# Patient Record
Sex: FEMALE | Race: WHITE | NOT HISPANIC OR LATINO | Employment: OTHER | URBAN - METROPOLITAN AREA
[De-identification: names, ages, dates, MRNs, and addresses within clinical notes are randomized per-mention and may not be internally consistent; named-entity substitution may affect disease eponyms.]

---

## 2017-05-15 ENCOUNTER — HOSPITAL ENCOUNTER (OUTPATIENT)
Dept: MAMMOGRAPHY | Facility: HOSPITAL | Age: 77
Discharge: HOME | End: 2017-05-15

## 2017-08-24 ENCOUNTER — OFFICE (AMBULATORY)
Dept: URBAN - METROPOLITAN AREA CLINIC 64 | Facility: CLINIC | Age: 77
End: 2017-08-24
Payer: COMMERCIAL

## 2017-08-24 VITALS
HEART RATE: 60 BPM | WEIGHT: 168 LBS | SYSTOLIC BLOOD PRESSURE: 128 MMHG | DIASTOLIC BLOOD PRESSURE: 71 MMHG | HEIGHT: 65 IN

## 2017-08-24 DIAGNOSIS — K59.00 CONSTIPATION, UNSPECIFIED: ICD-10-CM

## 2017-08-24 PROCEDURE — 99212 OFFICE O/P EST SF 10 MIN: CPT | Performed by: NURSE PRACTITIONER

## 2017-08-24 RX ORDER — MULTIVIT-MIN/IRON/FOLIC ACID/K 18-600-40
400 CAPSULE ORAL
Qty: 30 | Refills: 5 | Status: ACTIVE
Start: 2017-08-24

## 2019-06-05 ENCOUNTER — HOSPITAL ENCOUNTER (OUTPATIENT)
Dept: MAMMOGRAPHY | Facility: HOSPITAL | Age: 79
Discharge: HOME | End: 2019-06-05
Attending: OBSTETRICS & GYNECOLOGY | Admitting: OBSTETRICS & GYNECOLOGY

## 2019-07-17 ENCOUNTER — LAB REQUISITION (OUTPATIENT)
Dept: LAB | Facility: HOSPITAL | Age: 79
End: 2019-07-17

## 2019-07-17 ENCOUNTER — OFFICE (AMBULATORY)
Dept: URBAN - METROPOLITAN AREA PATHOLOGY 4 | Facility: PATHOLOGY | Age: 79
End: 2019-07-17
Payer: COMMERCIAL

## 2019-07-17 ENCOUNTER — ON CAMPUS - OUTPATIENT (AMBULATORY)
Dept: URBAN - METROPOLITAN AREA HOSPITAL 2 | Facility: HOSPITAL | Age: 79
End: 2019-07-17
Payer: COMMERCIAL

## 2019-07-17 VITALS
SYSTOLIC BLOOD PRESSURE: 108 MMHG | SYSTOLIC BLOOD PRESSURE: 106 MMHG | HEART RATE: 66 BPM | HEIGHT: 65 IN | TEMPERATURE: 96.6 F | SYSTOLIC BLOOD PRESSURE: 100 MMHG | SYSTOLIC BLOOD PRESSURE: 129 MMHG | HEART RATE: 54 BPM | WEIGHT: 150.6 LBS | HEART RATE: 58 BPM | OXYGEN SATURATION: 100 % | HEART RATE: 56 BPM | SYSTOLIC BLOOD PRESSURE: 97 MMHG | OXYGEN SATURATION: 99 % | DIASTOLIC BLOOD PRESSURE: 75 MMHG | OXYGEN SATURATION: 98 % | DIASTOLIC BLOOD PRESSURE: 53 MMHG | SYSTOLIC BLOOD PRESSURE: 104 MMHG | HEART RATE: 57 BPM | SYSTOLIC BLOOD PRESSURE: 95 MMHG | HEART RATE: 67 BPM | HEART RATE: 68 BPM | RESPIRATION RATE: 14 BRPM | DIASTOLIC BLOOD PRESSURE: 65 MMHG | DIASTOLIC BLOOD PRESSURE: 43 MMHG | RESPIRATION RATE: 20 BRPM | SYSTOLIC BLOOD PRESSURE: 101 MMHG | SYSTOLIC BLOOD PRESSURE: 125 MMHG | DIASTOLIC BLOOD PRESSURE: 64 MMHG | RESPIRATION RATE: 16 BRPM | OXYGEN SATURATION: 97 % | DIASTOLIC BLOOD PRESSURE: 46 MMHG | DIASTOLIC BLOOD PRESSURE: 55 MMHG | HEART RATE: 60 BPM | RESPIRATION RATE: 18 BRPM | SYSTOLIC BLOOD PRESSURE: 92 MMHG | DIASTOLIC BLOOD PRESSURE: 42 MMHG

## 2019-07-17 DIAGNOSIS — D12.2 BENIGN NEOPLASM OF ASCENDING COLON: ICD-10-CM

## 2019-07-17 DIAGNOSIS — K64.8 OTHER HEMORRHOIDS: ICD-10-CM

## 2019-07-17 DIAGNOSIS — Z86.010 PERSONAL HISTORY OF COLONIC POLYPS: ICD-10-CM

## 2019-07-17 DIAGNOSIS — K57.30 DIVERTICULOSIS OF LARGE INTESTINE WITHOUT PERFORATION OR ABS: ICD-10-CM

## 2019-07-17 DIAGNOSIS — Z86.010 HISTORY OF COLONIC POLYPS: ICD-10-CM

## 2019-07-17 DIAGNOSIS — K57.30 DIVERTICULOSIS OF LARGE INTESTINE WITHOUT PERFORATION OR ABSCESS WITHOUT BLEEDING: ICD-10-CM

## 2019-07-17 LAB
GI HISTOLOGY: A. UNSPECIFIED: (no result)
GI HISTOLOGY: PDF REPORT: (no result)

## 2019-07-17 PROCEDURE — 88305 TISSUE EXAM BY PATHOLOGIST: CPT | Mod: 26 | Performed by: INTERNAL MEDICINE

## 2019-07-17 PROCEDURE — 45380 COLONOSCOPY AND BIOPSY: CPT | Mod: PT | Performed by: INTERNAL MEDICINE

## 2019-07-17 PROCEDURE — 88305 TISSUE EXAM BY PATHOLOGIST: CPT | Performed by: INTERNAL MEDICINE

## 2019-07-17 RX ORDER — LUBIPROSTONE 24 UG/1
CAPSULE, GELATIN COATED ORAL
Qty: 90 | Refills: 3 | Status: ACTIVE
Start: 2019-07-17

## 2019-07-18 LAB
LAB AP CASE REPORT: NORMAL
PATH REPORT.FINAL DX SPEC: NORMAL
PATH REPORT.GROSS SPEC: NORMAL

## 2019-09-18 ENCOUNTER — OFFICE VISIT (OUTPATIENT)
Dept: NEUROLOGY | Facility: CLINIC | Age: 79
End: 2019-09-18

## 2019-09-18 VITALS
SYSTOLIC BLOOD PRESSURE: 144 MMHG | DIASTOLIC BLOOD PRESSURE: 65 MMHG | WEIGHT: 153.4 LBS | BODY MASS INDEX: 26.19 KG/M2 | HEIGHT: 64 IN | HEART RATE: 71 BPM

## 2019-09-18 DIAGNOSIS — G25.81 RESTLESS LEGS SYNDROME: Primary | ICD-10-CM

## 2019-09-18 DIAGNOSIS — G60.9 IDIOPATHIC PERIPHERAL NEUROPATHY: ICD-10-CM

## 2019-09-18 PROBLEM — N28.1 CYST OF RIGHT KIDNEY: Status: ACTIVE | Noted: 2018-09-14

## 2019-09-18 PROBLEM — F41.9 ANXIETY: Status: ACTIVE | Noted: 2019-09-04

## 2019-09-18 PROCEDURE — 99213 OFFICE O/P EST LOW 20 MIN: CPT | Performed by: PSYCHIATRY & NEUROLOGY

## 2019-09-18 RX ORDER — VALSARTAN AND HYDROCHLOROTHIAZIDE 80; 12.5 MG/1; MG/1
TABLET, FILM COATED ORAL
Refills: 3 | COMMUNITY
Start: 2019-07-26 | End: 2021-02-17

## 2019-09-18 RX ORDER — GABAPENTIN 300 MG/1
CAPSULE ORAL
Qty: 270 CAPSULE | Refills: 3 | Status: SHIPPED | OUTPATIENT
Start: 2019-09-18 | End: 2020-01-03 | Stop reason: SDUPTHER

## 2019-09-18 RX ORDER — CASTOR OIL
OIL (ML) ORAL SEE ADMIN INSTRUCTIONS
Refills: 0 | COMMUNITY
Start: 2019-07-15 | End: 2021-02-17

## 2019-09-18 RX ORDER — GABAPENTIN 300 MG/1
CAPSULE ORAL
Refills: 3 | COMMUNITY
Start: 2019-07-26 | End: 2019-09-18 | Stop reason: SDUPTHER

## 2019-09-18 RX ORDER — ROSUVASTATIN CALCIUM 20 MG/1
TABLET, COATED ORAL
COMMUNITY
Start: 2019-06-17

## 2019-09-18 RX ORDER — LORAZEPAM 0.5 MG/1
TABLET ORAL
Refills: 2 | COMMUNITY
Start: 2019-09-04

## 2019-09-18 NOTE — PROGRESS NOTES
Subjective:     Patient ID: Sarika Elder is a 79 y.o. female.    Chief complaint RLS    RESTLESS LEGS SYNDROME, yearly f/u patient doing well with gabapentin takes 2 tabs at hs if there really bad will take three but not that often.     Idiopathic peripheral neuropathy, f/u from [facility] patient having more increased numbness in the legs currently taking gabapentin 2 tabs at hs medication helps.   Three or 4 times per week has brief pain comes and goes.     The following portions of the patient's history were reviewed and updated as appropriate: allergies, current medications, past family history, past medical history, past social history, past surgical history and problem list.      Current Outpatient Medications:   •  aspirin 81 MG tablet, Take 81 mg by mouth Daily., Disp: , Rfl:   •  [employer] CITRATE OF MAGNESIA solution solution, See Admin Instructions., Disp: , Rfl: 0  •  gabapentin (NEURONTIN) 300 MG capsule, Two or three cap at night, Disp: 270 capsule, Rfl: 3  •  LORazepam (ATIVAN) 0.5 MG tablet, TAKE 1/2 TO 1 TABLET BY MOUTH EVERY DAY AS NEEDED FOR NERVES *MAX 1 TABLET DAILY, Disp: , Rfl: 2  •  rosuvastatin (CRESTOR) 20 MG tablet, , Disp: , Rfl:   •  valsartan-hydrochlorothiazide (DIOVAN-HCT) 80-12.5 MG per tablet, TAKE 1 TABLET BY MOUTH EVERY DAY (DISCONTINUE LOSARTAN), Disp: , Rfl: 3  •  Calcium Carbonate-Vitamin D (OSCAL-500) 500-400 MG-UNIT per tablet, Take 1 tablet by mouth Daily., Disp: , Rfl:     Review of Systems   Constitutional: Negative for appetite change and fatigue.   HENT: Positive for congestion. Negative for sinus pressure and sinus pain.    Eyes: Positive for visual disturbance. Negative for pain.   Respiratory: Negative for cough and shortness of breath.    Cardiovascular: Negative for chest pain and palpitations.   Gastrointestinal: Positive for constipation. Negative for diarrhea.   Endocrine: Positive for cold intolerance and heat intolerance.   Genitourinary: Negative for difficulty  urinating and frequency.   Musculoskeletal: Negative for back pain and gait problem.   Allergic/Immunologic: Positive for environmental allergies.   Neurological: Positive for numbness. Negative for dizziness, tremors, seizures, syncope, facial asymmetry, speech difficulty, weakness, light-headedness and headaches.   Psychiatric/Behavioral: Negative for agitation and confusion.          I have reviewed ROS completed by medical assistant.     Objective:    Physical Exam   Constitutional: She is oriented to person, place, and time. She appears well-developed.   Neurological: She is alert and oriented to person, place, and time. She displays abnormal reflex. A sensory deficit is present.   Numbness to several inches below the knees.    Knee and ankle reflex 0   Vitals reviewed.      Assessment/Plan:    Sarika was seen today for restless legs syndrome and peripheral neuropathy.    Diagnoses and all orders for this visit:    Restless legs syndrome    Idiopathic peripheral neuropathy    Other orders  -     gabapentin (NEURONTIN) 300 MG capsule; Two or three cap at night    1) neuropathy, slowly progressive over 10years.    2) RLS controlled with gabapentin        This document has been electronically signed by Joseph Seipel, MD on September 18, 2019 3:55 PM

## 2020-01-03 RX ORDER — GABAPENTIN 300 MG/1
CAPSULE ORAL
Qty: 270 CAPSULE | Refills: 3 | Status: SHIPPED | OUTPATIENT
Start: 2020-01-03

## 2020-06-02 ENCOUNTER — TRANSCRIBE ORDERS (OUTPATIENT)
Dept: ADMINISTRATIVE | Facility: HOSPITAL | Age: 80
End: 2020-06-02

## 2020-06-02 DIAGNOSIS — Z12.31 SCREENING MAMMOGRAM, ENCOUNTER FOR: Primary | ICD-10-CM

## 2020-06-08 ENCOUNTER — HOSPITAL ENCOUNTER (OUTPATIENT)
Dept: MAMMOGRAPHY | Facility: HOSPITAL | Age: 80
Discharge: HOME | End: 2020-06-08
Admitting: INTERNAL MEDICINE

## 2020-06-08 DIAGNOSIS — Z12.31 SCREENING MAMMOGRAM, ENCOUNTER FOR: ICD-10-CM

## 2020-06-08 PROCEDURE — 77063 BREAST TOMOSYNTHESIS BI: CPT

## 2020-06-08 PROCEDURE — 77067 SCR MAMMO BI INCL CAD: CPT

## 2020-06-21 ENCOUNTER — APPOINTMENT (OUTPATIENT)
Dept: CT IMAGING | Facility: HOSPITAL | Age: 80
End: 2020-06-21

## 2020-06-21 ENCOUNTER — HOSPITAL ENCOUNTER (EMERGENCY)
Facility: HOSPITAL | Age: 80
Discharge: HOME | End: 2020-06-21
Attending: EMERGENCY MEDICINE | Admitting: EMERGENCY MEDICINE

## 2020-06-21 VITALS
BODY MASS INDEX: 26.27 KG/M2 | HEIGHT: 64 IN | TEMPERATURE: 97.5 F | SYSTOLIC BLOOD PRESSURE: 140 MMHG | DIASTOLIC BLOOD PRESSURE: 72 MMHG | RESPIRATION RATE: 16 BRPM | HEART RATE: 59 BPM | OXYGEN SATURATION: 100 % | WEIGHT: 153.88 LBS

## 2020-06-21 DIAGNOSIS — M54.12 CERVICAL RADICULOPATHY: Primary | ICD-10-CM

## 2020-06-21 LAB
ANION GAP SERPL CALCULATED.3IONS-SCNC: 11 MMOL/L (ref 5–15)
BUN BLD-MCNC: 20 MG/DL (ref 8–23)
BUN BLD-MCNC: ABNORMAL MG/DL
BUN/CREAT SERPL: ABNORMAL
CALCIUM SPEC-SCNC: 10.5 MG/DL (ref 8.6–10.5)
CHLORIDE SERPL-SCNC: 100 MMOL/L (ref 98–107)
CO2 SERPL-SCNC: 26 MMOL/L (ref 22–29)
CREAT BLD-MCNC: 1.15 MG/DL (ref 0.57–1)
DEPRECATED RDW RBC AUTO: 45.1 FL (ref 37–54)
ERYTHROCYTE [DISTWIDTH] IN BLOOD BY AUTOMATED COUNT: 13.8 % (ref 12.3–15.4)
GFR SERPL CREATININE-BSD FRML MDRD: 45 ML/MIN/1.73
GIANT PLATELETS: NORMAL
GLUCOSE BLD-MCNC: 110 MG/DL (ref 65–99)
HCT VFR BLD AUTO: 40.9 % (ref 34–46.6)
HGB BLD-MCNC: 14 G/DL (ref 12–15.9)
HOLD SPECIMEN: NORMAL
HOLD SPECIMEN: NORMAL
LYMPHOCYTES # BLD MANUAL: 1.65 10*3/MM3 (ref 0.7–3.1)
LYMPHOCYTES NFR BLD MANUAL: 30 % (ref 19.6–45.3)
LYMPHOCYTES NFR BLD MANUAL: 6 % (ref 5–12)
MCH RBC QN AUTO: 32.2 PG (ref 26.6–33)
MCHC RBC AUTO-ENTMCNC: 34.2 G/DL (ref 31.5–35.7)
MCV RBC AUTO: 94.1 FL (ref 79–97)
MONOCYTES # BLD AUTO: 0.33 10*3/MM3 (ref 0.1–0.9)
NEUTROPHILS # BLD AUTO: 3.52 10*3/MM3 (ref 1.7–7)
NEUTROPHILS NFR BLD MANUAL: 61 % (ref 42.7–76)
NEUTS BAND NFR BLD MANUAL: 3 % (ref 0–5)
PLATELET # BLD AUTO: 196 10*3/MM3 (ref 140–450)
PMV BLD AUTO: 9.1 FL (ref 6–12)
POTASSIUM BLD-SCNC: 4 MMOL/L (ref 3.5–5.2)
RBC # BLD AUTO: 4.35 10*6/MM3 (ref 3.77–5.28)
RBC MORPH BLD: NORMAL
SCAN SLIDE: NORMAL
SODIUM BLD-SCNC: 137 MMOL/L (ref 136–145)
TROPONIN T SERPL-MCNC: <0.01 NG/ML (ref 0–0.03)
WBC MORPH BLD: NORMAL
WBC NRBC COR # BLD: 5.5 10*3/MM3 (ref 3.4–10.8)
WHOLE BLOOD HOLD SPECIMEN: NORMAL
WHOLE BLOOD HOLD SPECIMEN: NORMAL

## 2020-06-21 PROCEDURE — 85007 BL SMEAR W/DIFF WBC COUNT: CPT | Performed by: EMERGENCY MEDICINE

## 2020-06-21 PROCEDURE — 96375 TX/PRO/DX INJ NEW DRUG ADDON: CPT

## 2020-06-21 PROCEDURE — 80048 BASIC METABOLIC PNL TOTAL CA: CPT | Performed by: EMERGENCY MEDICINE

## 2020-06-21 PROCEDURE — 25010000002 ONDANSETRON PER 1 MG: Performed by: EMERGENCY MEDICINE

## 2020-06-21 PROCEDURE — 93005 ELECTROCARDIOGRAM TRACING: CPT | Performed by: EMERGENCY MEDICINE

## 2020-06-21 PROCEDURE — 25010000002 MORPHINE PER 10 MG: Performed by: EMERGENCY MEDICINE

## 2020-06-21 PROCEDURE — 96374 THER/PROPH/DIAG INJ IV PUSH: CPT

## 2020-06-21 PROCEDURE — 72125 CT NECK SPINE W/O DYE: CPT

## 2020-06-21 PROCEDURE — 85025 COMPLETE CBC W/AUTO DIFF WBC: CPT | Performed by: EMERGENCY MEDICINE

## 2020-06-21 PROCEDURE — 84484 ASSAY OF TROPONIN QUANT: CPT | Performed by: EMERGENCY MEDICINE

## 2020-06-21 PROCEDURE — 25010000002 METHYLPREDNISOLONE PER 125 MG: Performed by: EMERGENCY MEDICINE

## 2020-06-21 PROCEDURE — 99284 EMERGENCY DEPT VISIT MOD MDM: CPT

## 2020-06-21 RX ORDER — HYDROCODONE BITARTRATE AND ACETAMINOPHEN 7.5; 325 MG/1; MG/1
1 TABLET ORAL EVERY 4 HOURS PRN
Qty: 24 TABLET | Refills: 0 | Status: SHIPPED | OUTPATIENT
Start: 2020-06-21 | End: 2021-02-17

## 2020-06-21 RX ORDER — ONDANSETRON 2 MG/ML
4 INJECTION INTRAMUSCULAR; INTRAVENOUS ONCE
Status: COMPLETED | OUTPATIENT
Start: 2020-06-21 | End: 2020-06-21

## 2020-06-21 RX ORDER — MORPHINE SULFATE 4 MG/ML
2 INJECTION, SOLUTION INTRAMUSCULAR; INTRAVENOUS ONCE
Status: COMPLETED | OUTPATIENT
Start: 2020-06-21 | End: 2020-06-21

## 2020-06-21 RX ORDER — PREDNISONE 20 MG/1
20 TABLET ORAL 2 TIMES DAILY
Qty: 10 TABLET | Refills: 0 | Status: SHIPPED | OUTPATIENT
Start: 2020-06-21 | End: 2021-02-17

## 2020-06-21 RX ORDER — METHYLPREDNISOLONE SODIUM SUCCINATE 125 MG/2ML
125 INJECTION, POWDER, LYOPHILIZED, FOR SOLUTION INTRAMUSCULAR; INTRAVENOUS ONCE
Status: COMPLETED | OUTPATIENT
Start: 2020-06-21 | End: 2020-06-21

## 2020-06-21 RX ADMIN — METHYLPREDNISOLONE SODIUM SUCCINATE 125 MG: 125 INJECTION, POWDER, FOR SOLUTION INTRAMUSCULAR; INTRAVENOUS at 06:20

## 2020-06-21 RX ADMIN — ONDANSETRON 4 MG: 2 INJECTION INTRAMUSCULAR; INTRAVENOUS at 06:20

## 2020-06-21 RX ADMIN — MORPHINE SULFATE 2 MG: 4 INJECTION INTRAVENOUS at 06:20

## 2020-06-21 NOTE — ED PROVIDER NOTES
Subjective   Pleasant 80-year-old female complains of nontraumatic left lateral neck pain radiating down left upper extremity.  Worse with left arm movement and neck movement.  Nonexertional, moderate to severe for 2 weeks.  Gradual onset.  No fever, chest pain, shortness of air, weakness or numbness.          Review of Systems   Musculoskeletal: Positive for neck pain.        Left upper extremity pain   All other systems reviewed and are negative.      Past Medical History:   Diagnosis Date   • Hyperlipidemia    • Hypertension    • Peripheral neuropathy        No Known Allergies    Past Surgical History:   Procedure Laterality Date   • GALLBLADDER SURGERY     • HYSTERECTOMY         History reviewed. No pertinent family history.    Social History     Socioeconomic History   • Marital status:      Spouse name: Not on file   • Number of children: Not on file   • Years of education: Not on file   • Highest education level: Not on file   Tobacco Use   • Smoking status: Never Smoker   • Smokeless tobacco: Never Used   Substance and Sexual Activity   • Alcohol use: No     Frequency: Never   • Drug use: No   • Sexual activity: Defer           Objective   Physical Exam   Constitutional: She is oriented to person, place, and time. She appears well-developed and well-nourished.   HENT:   Head: Normocephalic and atraumatic.   Mouth/Throat: Oropharynx is clear and moist.   Eyes: Pupils are equal, round, and reactive to light. Conjunctivae and EOM are normal.   Neck:   No carotid bruit, neck nontender, pain left lateral neck with range of motion   Cardiovascular: Normal rate, regular rhythm, normal heart sounds and intact distal pulses.   Pulmonary/Chest: Effort normal and breath sounds normal.   Abdominal: Soft. Bowel sounds are normal. She exhibits no distension.   Musculoskeletal: Normal range of motion. She exhibits no edema or tenderness.   Left upper extremity with normal inspection, nontender, full range of motion  without increased pain.   Neurological: She is alert and oriented to person, place, and time.   Motor and sensation intact   Skin: Skin is warm and dry. Capillary refill takes less than 2 seconds.   Psychiatric: She has a normal mood and affect. Her behavior is normal.       Procedures           ED Course  ED Course as of Jun 21 0717   Sun Jun 21, 2020   0610 EKG interpretation: Normal sinus rhythm, rate 60, no acute ST change    [JR]      ED Course User Index  [JR] Wilson Rajan MD                                           [facility]  Number of Diagnoses or Management Options  Cervical radiculopathy:   Diagnosis management comments: Results for orders placed or performed during the hospital encounter of 06/21/20  -Basic Metabolic Panel       Result                      Value             Ref Range           Glucose                     110 (H)           65 - 99 mg/dL       BUN                                                               Creatinine                  1.15 (H)          0.57 - 1.00 *       Sodium                      137               136 - 145 mm*       Potassium                   4.0               3.5 - 5.2 mm*       Chloride                    100               98 - 107 mmo*       CO2                         26.0              22.0 - 29.0 *       Calcium                     10.5              8.6 - 10.5 m*       eGFR Non  Amer       45 (L)            >60 mL/min/1*       BUN/Creatinine Ratio                                              Anion Gap                   11.0              5.0 - 15.0 m*  -Troponin       Result                      Value             Ref Range           Troponin T                  <0.010            0.000 - 0.03*  -CBC Auto Differential       Result                      Value             Ref Range           WBC                         5.50              3.40 - 10.80*       RBC                         4.35              3.77 - 5.28 *       Hemoglobin                  14.0               12.0 - 15.9 *       Hematocrit                  40.9              34.0 - 46.6 %       MCV                         94.1              79.0 - 97.0 *       MCH                         32.2              26.6 - 33.0 *       MCHC                        34.2              31.5 - 35.7 *       RDW                         13.8              12.3 - 15.4 %       RDW-SD                      45.1              37.0 - 54.0 *       MPV                         9.1               6.0 - 12.0 fL       Platelets                   196               140 - 450 10*  -BUN       Result                      Value             Ref Range           BUN                         20                8 - 23 mg/dL   -Light Blue Top       Result                      Value             Ref Range           Extra Tube                                                    hold for add-on  -Green Top (Gel)       Result                      Value             Ref Range           Extra Tube                                                    Hold for add-ons.  -Lavender Top       Result                      Value             Ref Range           Extra Tube                                                    hold for add-on  -Gold Top - SST       Result                      Value             Ref Range           Extra Tube                                                    Hold for add-ons.  Ct Cervical Spine Without Contrast    Result Date: 6/21/2020  1. Allowing for osteopenia and motion artifact, no acute cervical spine fracture is identified. 2. Degenerative changes as detailed above. Trevor Travis M.D. Neuroradiologist  Electronically signed by:  Trevor Travis M.D.  6/21/2020 5:07 AM      Well, pain improved, inspect reviewed.  PCP follow-up is encouraged, clinically with cervical radiculopathy.  Patient will return immediately if uncontrolled pain, fever, numbness or weakness, change in pain pattern or exertional symptoms.       Amount and/or Complexity of Data  Reviewed  Clinical lab tests: reviewed  Tests in the radiology section of CPT®: reviewed        Final diagnoses:   Cervical radiculopathy            Wilson Rajan MD  06/21/20 0717

## 2020-10-20 ENCOUNTER — HOSPITAL ENCOUNTER (OUTPATIENT)
Dept: CARDIOLOGY | Facility: HOSPITAL | Age: 80
Discharge: HOME | End: 2020-10-20

## 2020-10-20 ENCOUNTER — LAB (OUTPATIENT)
Dept: LAB | Facility: HOSPITAL | Age: 80
End: 2020-10-20

## 2020-10-20 ENCOUNTER — TRANSCRIBE ORDERS (OUTPATIENT)
Dept: ADMINISTRATIVE | Facility: HOSPITAL | Age: 80
End: 2020-10-20

## 2020-10-20 DIAGNOSIS — N28.89 URETERAL FISTULA: Primary | ICD-10-CM

## 2020-10-20 DIAGNOSIS — Z01.818 PREOP TESTING: ICD-10-CM

## 2020-10-20 DIAGNOSIS — N28.89 URETERAL FISTULA: ICD-10-CM

## 2020-10-20 LAB
ABO GROUP BLD: NORMAL
ANION GAP SERPL CALCULATED.3IONS-SCNC: 8.7 MMOL/L (ref 5–15)
BASOPHILS # BLD AUTO: 0.02 10*3/MM3 (ref 0–0.2)
BASOPHILS NFR BLD AUTO: 0.4 % (ref 0–1.5)
BLD GP AB SCN SERPL QL: NEGATIVE
BUN SERPL-MCNC: 19 MG/DL (ref 8–23)
BUN/CREAT SERPL: 17.4 (ref 7–25)
CALCIUM SPEC-SCNC: 9.7 MG/DL (ref 8.6–10.5)
CHLORIDE SERPL-SCNC: 103 MMOL/L (ref 98–107)
CO2 SERPL-SCNC: 27.3 MMOL/L (ref 22–29)
CREAT SERPL-MCNC: 1.09 MG/DL (ref 0.57–1)
DEPRECATED RDW RBC AUTO: 43.3 FL (ref 37–54)
EOSINOPHIL # BLD AUTO: 0.07 10*3/MM3 (ref 0–0.4)
EOSINOPHIL NFR BLD AUTO: 1.6 % (ref 0.3–6.2)
ERYTHROCYTE [DISTWIDTH] IN BLOOD BY AUTOMATED COUNT: 12.3 % (ref 12.3–15.4)
GFR SERPL CREATININE-BSD FRML MDRD: 48 ML/MIN/1.73
GLUCOSE SERPL-MCNC: 99 MG/DL (ref 65–99)
HCT VFR BLD AUTO: 39 % (ref 34–46.6)
HGB BLD-MCNC: 13.1 G/DL (ref 12–15.9)
IMM GRANULOCYTES # BLD AUTO: 0.01 10*3/MM3 (ref 0–0.05)
IMM GRANULOCYTES NFR BLD AUTO: 0.2 % (ref 0–0.5)
LYMPHOCYTES # BLD AUTO: 1.34 10*3/MM3 (ref 0.7–3.1)
LYMPHOCYTES NFR BLD AUTO: 30.1 % (ref 19.6–45.3)
MCH RBC QN AUTO: 32 PG (ref 26.6–33)
MCHC RBC AUTO-ENTMCNC: 33.6 G/DL (ref 31.5–35.7)
MCV RBC AUTO: 95.1 FL (ref 79–97)
MONOCYTES # BLD AUTO: 0.37 10*3/MM3 (ref 0.1–0.9)
MONOCYTES NFR BLD AUTO: 8.3 % (ref 5–12)
NEUTROPHILS NFR BLD AUTO: 2.64 10*3/MM3 (ref 1.7–7)
NEUTROPHILS NFR BLD AUTO: 59.4 % (ref 42.7–76)
NRBC BLD AUTO-RTO: 0 /100 WBC (ref 0–0.2)
PLATELET # BLD AUTO: 181 10*3/MM3 (ref 140–450)
PMV BLD AUTO: 11 FL (ref 6–12)
POTASSIUM SERPL-SCNC: 4.4 MMOL/L (ref 3.5–5.2)
RBC # BLD AUTO: 4.1 10*6/MM3 (ref 3.77–5.28)
RH BLD: POSITIVE
SODIUM SERPL-SCNC: 139 MMOL/L (ref 136–145)
T&S EXPIRATION DATE: NORMAL
WBC # BLD AUTO: 4.45 10*3/MM3 (ref 3.4–10.8)

## 2020-10-20 PROCEDURE — 86850 RBC ANTIBODY SCREEN: CPT | Performed by: UROLOGY

## 2020-10-20 PROCEDURE — 36415 COLL VENOUS BLD VENIPUNCTURE: CPT

## 2020-10-20 PROCEDURE — 86900 BLOOD TYPING SEROLOGIC ABO: CPT | Performed by: UROLOGY

## 2020-10-20 PROCEDURE — 86901 BLOOD TYPING SEROLOGIC RH(D): CPT | Performed by: UROLOGY

## 2020-10-20 PROCEDURE — 93005 ELECTROCARDIOGRAM TRACING: CPT | Performed by: UROLOGY

## 2020-10-20 PROCEDURE — 93010 ELECTROCARDIOGRAM REPORT: CPT | Performed by: INTERNAL MEDICINE

## 2020-10-20 PROCEDURE — 86900 BLOOD TYPING SEROLOGIC ABO: CPT

## 2020-10-20 PROCEDURE — 80048 BASIC METABOLIC PNL TOTAL CA: CPT

## 2020-10-20 PROCEDURE — 85025 COMPLETE CBC W/AUTO DIFF WBC: CPT

## 2020-10-20 PROCEDURE — 86901 BLOOD TYPING SEROLOGIC RH(D): CPT

## 2020-11-04 ENCOUNTER — OFFICE VISIT (OUTPATIENT)
Dept: CARDIOLOGY | Facility: CLINIC | Age: 80
End: 2020-11-04

## 2020-11-04 VITALS
OXYGEN SATURATION: 95 % | HEART RATE: 66 BPM | BODY MASS INDEX: 25.58 KG/M2 | DIASTOLIC BLOOD PRESSURE: 68 MMHG | SYSTOLIC BLOOD PRESSURE: 134 MMHG | WEIGHT: 149 LBS

## 2020-11-04 DIAGNOSIS — R94.31 ABNORMAL ELECTROCARDIOGRAM (ECG) (EKG): ICD-10-CM

## 2020-11-04 DIAGNOSIS — Z01.818 PRE-OP EXAMINATION: Primary | ICD-10-CM

## 2020-11-04 DIAGNOSIS — E78.2 MIXED HYPERLIPIDEMIA: ICD-10-CM

## 2020-11-04 DIAGNOSIS — Z01.810 PREOP CARDIOVASCULAR EXAM: ICD-10-CM

## 2020-11-04 DIAGNOSIS — I10 ESSENTIAL HYPERTENSION: ICD-10-CM

## 2020-11-04 PROCEDURE — 99203 OFFICE O/P NEW LOW 30 MIN: CPT | Performed by: INTERNAL MEDICINE

## 2020-11-04 RX ORDER — CIPROFLOXACIN 500 MG/1
500 TABLET, FILM COATED ORAL 2 TIMES DAILY
COMMUNITY
End: 2021-02-17

## 2020-11-04 NOTE — PROGRESS NOTES
Cardiology Office Visit      Encounter Date:  11/04/2020    Patient ID:   Sarika Elder is a 80 y.o. female.    Reason For Followup:  Preop evaluation  Abnormal EKG    Brief Clinical History:  Dear Kalyan La IV, MD    I had the pleasure of seeing Sarika Elder today. As you are well aware, this is a 80 y.o. female with a past medical history that is significant for history of hypertension hyperlipidemia and family history of coronary artery disease presented to the office for further evaluation and treatment options for abnormal EKG and preop evaluation for nephrectomy    Interval History:  Patient has some nonspecific year and jaw discomfort  Also has some nonspecific chest discomfort in the past  No prior diagnosis of coronary artery disease no cardiac work-up or evaluation  Denies any typical exertional symptoms  Denies any orthopnea PND  No dizziness no syncope  Currently diagnosed with renal mass and currently being evaluated by possible nephrectomy  Surgery was postponed secondary to abnormal EKG    Assessment & Plan    Impressions:  Preop evaluation  Abnormal EKG  Hypertension  Hyperlipidemia  Renal mass    Recommendations:  Discussed with the patient the risk and benefits and alternatives  The multiple cardiac risk factors interrogation family history of coronary artery disease plan at this time is to proceed with the Cardiolite stress test for further stratification before proceeding with the possible nephrectomy procedure  Risk benefits and alternatives reviewed and discussed with the patient  If the stress test is low risk we will proceed with a nephrectomy  Follow-up in office in 3 months    Objective:    Vitals:  Vitals:    11/04/20 0936   Weight: 67.6 kg (149 lb)       Physical Exam:    General: Alert, cooperative, no distress, appears stated age  Head:  Normocephalic, atraumatic, mucous membranes moist  Eyes:  Conjunctiva/corneas clear, EOM's intact     Neck:  Supple,  no adenopathy;       Lungs: Clear to auscultation bilaterally, no wheezes rhonchi rales are noted  Chest wall: No tenderness  Heart::  Regular rate and rhythm, S1 and S2 normal, no murmur, rub or gallop  Abdomen: Soft, non-tender, nondistended bowel sounds active  Extremities: No cyanosis, clubbing, or edema  Pulses: 2+ and symmetric all extremities  Skin:  No rashes or lesions  Neuro/psych: A&O x3. CN II through XII are grossly intact with appropriate affect      Allergies:  No Known Allergies    Medication Review:     Current Outpatient Medications:   •  ciprofloxacin (CIPRO) 500 MG tablet, Take 500 mg by mouth 2 (Two) Times a Day., Disp: , Rfl:   •  gabapentin (NEURONTIN) 300 MG capsule, Two or three cap at night, Disp: 270 capsule, Rfl: 3  •  LORazepam (ATIVAN) 0.5 MG tablet, TAKE 1/2 TO 1 TABLET BY MOUTH EVERY DAY AS NEEDED FOR NERVES *MAX 1 TABLET DAILY, Disp: , Rfl: 2  •  rosuvastatin (CRESTOR) 20 MG tablet, , Disp: , Rfl:   •  valsartan-hydrochlorothiazide (DIOVAN-HCT) 80-12.5 MG per tablet, TAKE 1 TABLET BY MOUTH EVERY DAY (DISCONTINUE LOSARTAN), Disp: , Rfl: 3  •  aspirin 81 MG tablet, Take 81 mg by mouth Daily., Disp: , Rfl:   •  Calcium Carbonate-Vitamin D (OSCAL-500) 500-400 MG-UNIT per tablet, Take 1 tablet by mouth Daily., Disp: , Rfl:   •  [employer] CITRATE OF MAGNESIA solution solution, See Admin Instructions., Disp: , Rfl: 0  •  HYDROcodone-acetaminophen (NORCO) 7.5-325 MG per tablet, Take 1 tablet by mouth Every 4 (Four) Hours As Needed for Moderate Pain ., Disp: 24 tablet, Rfl: 0  •  predniSONE (DELTASONE) 20 MG tablet, Take 1 tablet by mouth 2 (Two) Times a Day., Disp: 10 tablet, Rfl: 0    Family History:  History reviewed. No pertinent family history.    Past Medical History:  Past Medical History:   Diagnosis Date   • Hyperlipidemia    • Hypertension    • Peripheral neuropathy        Past surgical History:  Past Surgical History:   Procedure Laterality Date   • GALLBLADDER SURGERY     • HYSTERECTOMY         Social  History:  Social History     Socioeconomic History   • Marital status:      Spouse name: Not on file   • Number of children: Not on file   • Years of education: Not on file   • Highest education level: Not on file   Tobacco Use   • Smoking status: Never Smoker   • Smokeless tobacco: Never Used   Substance and Sexual Activity   • Alcohol use: No     Frequency: Never   • Drug use: No   • Sexual activity: Defer       Review of Systems:  The following systems were reviewed as they relate to the cardiovascular system: Constitutional, Eyes, ENT, Cardiovascular, Respiratory, Gastrointestinal, Integumentary, Neurological, Psychiatric, Hematologic, Endocrine, Musculoskeletal, and Genitourinary. The pertinent cardiovascular findings are reported above with all other cardiovascular points within those systems being negative.    Diagnostic Study Review:     Current Electrocardiogram:  Procedures      NOTE: The following portions of the patient's history were reviewed and updated this visit as appropriate: allergies, current medications, past family history, past medical history, past social history, past surgical history and problem list.

## 2020-11-10 ENCOUNTER — HOSPITAL ENCOUNTER (OUTPATIENT)
Dept: CARDIOLOGY | Facility: HOSPITAL | Age: 80
Discharge: HOME | End: 2020-11-10

## 2020-11-10 DIAGNOSIS — R94.31 ABNORMAL ELECTROCARDIOGRAM (ECG) (EKG): ICD-10-CM

## 2020-11-10 DIAGNOSIS — Z01.818 PRE-OP EXAMINATION: ICD-10-CM

## 2020-11-10 PROCEDURE — 78452 HT MUSCLE IMAGE SPECT MULT: CPT

## 2020-11-10 PROCEDURE — 0 TECHNETIUM SESTAMIBI: Performed by: INTERNAL MEDICINE

## 2020-11-10 PROCEDURE — A9500 TC99M SESTAMIBI: HCPCS | Performed by: INTERNAL MEDICINE

## 2020-11-10 PROCEDURE — 93018 CV STRESS TEST I&R ONLY: CPT | Performed by: INTERNAL MEDICINE

## 2020-11-10 PROCEDURE — 93017 CV STRESS TEST TRACING ONLY: CPT

## 2020-11-10 PROCEDURE — 25010000002 REGADENOSON 0.4 MG/5ML SOLUTION: Performed by: INTERNAL MEDICINE

## 2020-11-10 PROCEDURE — 78452 HT MUSCLE IMAGE SPECT MULT: CPT | Performed by: INTERNAL MEDICINE

## 2020-11-10 PROCEDURE — 93016 CV STRESS TEST SUPVJ ONLY: CPT | Performed by: INTERNAL MEDICINE

## 2020-11-10 RX ADMIN — REGADENOSON 0.4 MG: 0.08 INJECTION, SOLUTION INTRAVENOUS at 09:15

## 2020-11-10 RX ADMIN — TECHNETIUM TC 99M SESTAMIBI 1 DOSE: 1 INJECTION INTRAVENOUS at 09:15

## 2020-11-10 RX ADMIN — TECHNETIUM TC 99M SESTAMIBI 1 DOSE: 1 INJECTION INTRAVENOUS at 08:20

## 2020-11-17 ENCOUNTER — TELEPHONE (OUTPATIENT)
Dept: CARDIOLOGY | Facility: CLINIC | Age: 80
End: 2020-11-17

## 2020-11-17 LAB
BH CV STRESS BP STAGE 1: NORMAL
BH CV STRESS COMMENTS STAGE 1: NORMAL
BH CV STRESS DOSE REGADENOSON STAGE 1: 0.4
BH CV STRESS DURATION MIN STAGE 1: 0
BH CV STRESS DURATION SEC STAGE 1: 10
BH CV STRESS HR STAGE 1: 87
BH CV STRESS PROTOCOL 1: NORMAL
BH CV STRESS RECOVERY BP: NORMAL MMHG
BH CV STRESS RECOVERY HR: 74 BPM
BH CV STRESS STAGE 1: 1
LV EF NUC BP: 76 %
MAXIMAL PREDICTED HEART RATE: 140 BPM
PERCENT MAX PREDICTED HR: 62.14 %
STRESS BASELINE BP: NORMAL MMHG
STRESS BASELINE HR: 60 BPM
STRESS PERCENT HR: 73 %
STRESS POST PEAK BP: NORMAL MMHG
STRESS POST PEAK HR: 87 BPM
STRESS TARGET HR: 119 BPM

## 2020-11-17 NOTE — TELEPHONE ENCOUNTER
Called and informed the Pt per Dr. Garcia that her stress test was normal. Pt stated understanding.

## 2020-12-08 ENCOUNTER — LAB (OUTPATIENT)
Dept: LAB | Facility: HOSPITAL | Age: 80
End: 2020-12-08

## 2020-12-08 ENCOUNTER — TRANSCRIBE ORDERS (OUTPATIENT)
Dept: ADMINISTRATIVE | Facility: HOSPITAL | Age: 80
End: 2020-12-08

## 2020-12-08 DIAGNOSIS — N28.89 RENAL MASS, RIGHT: ICD-10-CM

## 2020-12-08 DIAGNOSIS — Z01.818 PREOPERATIVE CLEARANCE: Primary | ICD-10-CM

## 2020-12-08 DIAGNOSIS — Z01.818 PREOPERATIVE CLEARANCE: ICD-10-CM

## 2020-12-08 LAB
ABO GROUP BLD: NORMAL
ANION GAP SERPL CALCULATED.3IONS-SCNC: 6.7 MMOL/L (ref 5–15)
BASOPHILS # BLD AUTO: 0.02 10*3/MM3 (ref 0–0.2)
BASOPHILS NFR BLD AUTO: 0.5 % (ref 0–1.5)
BLD GP AB SCN SERPL QL: NEGATIVE
BUN SERPL-MCNC: 21 MG/DL (ref 8–23)
BUN/CREAT SERPL: 23.9 (ref 7–25)
CALCIUM SPEC-SCNC: 9.6 MG/DL (ref 8.6–10.5)
CHLORIDE SERPL-SCNC: 101 MMOL/L (ref 98–107)
CO2 SERPL-SCNC: 28.3 MMOL/L (ref 22–29)
CREAT SERPL-MCNC: 0.88 MG/DL (ref 0.57–1)
DEPRECATED RDW RBC AUTO: 43.1 FL (ref 37–54)
EOSINOPHIL # BLD AUTO: 0.09 10*3/MM3 (ref 0–0.4)
EOSINOPHIL NFR BLD AUTO: 2.1 % (ref 0.3–6.2)
ERYTHROCYTE [DISTWIDTH] IN BLOOD BY AUTOMATED COUNT: 12.6 % (ref 12.3–15.4)
GFR SERPL CREATININE-BSD FRML MDRD: 62 ML/MIN/1.73
GLUCOSE SERPL-MCNC: 104 MG/DL (ref 65–99)
HCT VFR BLD AUTO: 37.2 % (ref 34–46.6)
HGB BLD-MCNC: 12.4 G/DL (ref 12–15.9)
IMM GRANULOCYTES # BLD AUTO: 0 10*3/MM3 (ref 0–0.05)
IMM GRANULOCYTES NFR BLD AUTO: 0 % (ref 0–0.5)
LYMPHOCYTES # BLD AUTO: 1.66 10*3/MM3 (ref 0.7–3.1)
LYMPHOCYTES NFR BLD AUTO: 39.1 % (ref 19.6–45.3)
MCH RBC QN AUTO: 31.3 PG (ref 26.6–33)
MCHC RBC AUTO-ENTMCNC: 33.3 G/DL (ref 31.5–35.7)
MCV RBC AUTO: 93.9 FL (ref 79–97)
MONOCYTES # BLD AUTO: 0.39 10*3/MM3 (ref 0.1–0.9)
MONOCYTES NFR BLD AUTO: 9.2 % (ref 5–12)
NEUTROPHILS NFR BLD AUTO: 2.09 10*3/MM3 (ref 1.7–7)
NEUTROPHILS NFR BLD AUTO: 49.1 % (ref 42.7–76)
NRBC BLD AUTO-RTO: 0 /100 WBC (ref 0–0.2)
PLATELET # BLD AUTO: 195 10*3/MM3 (ref 140–450)
PMV BLD AUTO: 10.9 FL (ref 6–12)
POTASSIUM SERPL-SCNC: 4.1 MMOL/L (ref 3.5–5.2)
RBC # BLD AUTO: 3.96 10*6/MM3 (ref 3.77–5.28)
RH BLD: POSITIVE
SODIUM SERPL-SCNC: 136 MMOL/L (ref 136–145)
T&S EXPIRATION DATE: NORMAL
WBC # BLD AUTO: 4.25 10*3/MM3 (ref 3.4–10.8)

## 2020-12-08 PROCEDURE — 36415 COLL VENOUS BLD VENIPUNCTURE: CPT

## 2020-12-08 PROCEDURE — 86901 BLOOD TYPING SEROLOGIC RH(D): CPT

## 2020-12-08 PROCEDURE — 86900 BLOOD TYPING SEROLOGIC ABO: CPT

## 2020-12-08 PROCEDURE — 80048 BASIC METABOLIC PNL TOTAL CA: CPT

## 2020-12-08 PROCEDURE — C9803 HOPD COVID-19 SPEC COLLECT: HCPCS

## 2020-12-08 PROCEDURE — 86850 RBC ANTIBODY SCREEN: CPT

## 2020-12-08 PROCEDURE — U0004 COV-19 TEST NON-CDC HGH THRU: HCPCS

## 2020-12-08 PROCEDURE — 85025 COMPLETE CBC W/AUTO DIFF WBC: CPT

## 2020-12-09 LAB — SARS-COV-2 RNA RESP QL NAA+PROBE: NOT DETECTED

## 2020-12-14 ENCOUNTER — LAB REQUISITION (OUTPATIENT)
Dept: LAB | Facility: HOSPITAL | Age: 80
End: 2020-12-14

## 2020-12-14 DIAGNOSIS — Z00.00 ENCOUNTER FOR GENERAL ADULT MEDICAL EXAMINATION WITHOUT ABNORMAL FINDINGS: ICD-10-CM

## 2020-12-14 PROCEDURE — 88307 TISSUE EXAM BY PATHOLOGIST: CPT | Performed by: UROLOGY

## 2020-12-14 PROCEDURE — 88304 TISSUE EXAM BY PATHOLOGIST: CPT | Performed by: UROLOGY

## 2020-12-15 ENCOUNTER — LAB REQUISITION (OUTPATIENT)
Dept: LAB | Facility: HOSPITAL | Age: 80
End: 2020-12-15

## 2020-12-15 DIAGNOSIS — N28.89 OTHER SPECIFIED DISORDERS OF KIDNEY AND URETER: ICD-10-CM

## 2020-12-15 LAB
ANION GAP SERPL CALCULATED.3IONS-SCNC: 5 MMOL/L (ref 5–15)
BASOPHILS # BLD AUTO: 0 10*3/MM3 (ref 0–0.2)
BASOPHILS NFR BLD AUTO: 0.1 % (ref 0–1.5)
BUN SERPL-MCNC: 13 MG/DL (ref 8–23)
BUN/CREAT SERPL: 11.3 (ref 7–25)
CALCIUM SPEC-SCNC: 8.5 MG/DL (ref 8.6–10.5)
CHLORIDE SERPL-SCNC: 102 MMOL/L (ref 98–107)
CO2 SERPL-SCNC: 27 MMOL/L (ref 22–29)
CREAT SERPL-MCNC: 1.15 MG/DL (ref 0.57–1)
DEPRECATED RDW RBC AUTO: 45.1 FL (ref 37–54)
EOSINOPHIL # BLD AUTO: 0 10*3/MM3 (ref 0–0.4)
EOSINOPHIL NFR BLD AUTO: 0.4 % (ref 0.3–6.2)
ERYTHROCYTE [DISTWIDTH] IN BLOOD BY AUTOMATED COUNT: 13.5 % (ref 12.3–15.4)
GFR SERPL CREATININE-BSD FRML MDRD: 45 ML/MIN/1.73
GLUCOSE SERPL-MCNC: 136 MG/DL (ref 65–99)
HCT VFR BLD AUTO: 34.3 % (ref 34–46.6)
HGB BLD-MCNC: 11.3 G/DL (ref 12–15.9)
LAB AP CASE REPORT: NORMAL
LYMPHOCYTES # BLD AUTO: 0.9 10*3/MM3 (ref 0.7–3.1)
LYMPHOCYTES NFR BLD AUTO: 15.9 % (ref 19.6–45.3)
MCH RBC QN AUTO: 31.4 PG (ref 26.6–33)
MCHC RBC AUTO-ENTMCNC: 33 G/DL (ref 31.5–35.7)
MCV RBC AUTO: 95.4 FL (ref 79–97)
MONOCYTES # BLD AUTO: 0.5 10*3/MM3 (ref 0.1–0.9)
MONOCYTES NFR BLD AUTO: 9.1 % (ref 5–12)
NEUTROPHILS NFR BLD AUTO: 4.1 10*3/MM3 (ref 1.7–7)
NEUTROPHILS NFR BLD AUTO: 74.5 % (ref 42.7–76)
NRBC BLD AUTO-RTO: 0 /100 WBC (ref 0–0.2)
PATH REPORT.FINAL DX SPEC: NORMAL
PATH REPORT.GROSS SPEC: NORMAL
PLATELET # BLD AUTO: 142 10*3/MM3 (ref 140–450)
PMV BLD AUTO: 9.4 FL (ref 6–12)
POTASSIUM SERPL-SCNC: 4.3 MMOL/L (ref 3.5–5.2)
RBC # BLD AUTO: 3.59 10*6/MM3 (ref 3.77–5.28)
SODIUM SERPL-SCNC: 134 MMOL/L (ref 136–145)
WBC # BLD AUTO: 5.5 10*3/MM3 (ref 3.4–10.8)

## 2020-12-15 PROCEDURE — 85025 COMPLETE CBC W/AUTO DIFF WBC: CPT

## 2020-12-15 PROCEDURE — 80048 BASIC METABOLIC PNL TOTAL CA: CPT

## 2020-12-15 PROCEDURE — 80048 BASIC METABOLIC PNL TOTAL CA: CPT | Performed by: UROLOGY

## 2020-12-15 PROCEDURE — 85025 COMPLETE CBC W/AUTO DIFF WBC: CPT | Performed by: UROLOGY

## 2020-12-16 ENCOUNTER — LAB REQUISITION (OUTPATIENT)
Dept: LAB | Facility: HOSPITAL | Age: 80
End: 2020-12-16

## 2020-12-16 DIAGNOSIS — N28.9 DISORDER OF KIDNEY AND URETER, UNSPECIFIED: ICD-10-CM

## 2020-12-16 DIAGNOSIS — N28.89 OTHER SPECIFIED DISORDERS OF KIDNEY AND URETER: ICD-10-CM

## 2020-12-16 LAB
ANION GAP SERPL CALCULATED.3IONS-SCNC: 7 MMOL/L (ref 5–15)
BASOPHILS # BLD AUTO: 0 10*3/MM3 (ref 0–0.2)
BASOPHILS NFR BLD AUTO: 0.2 % (ref 0–1.5)
BUN SERPL-MCNC: 8 MG/DL (ref 8–23)
BUN/CREAT SERPL: 8.2 (ref 7–25)
CALCIUM SPEC-SCNC: 8.6 MG/DL (ref 8.6–10.5)
CHLORIDE SERPL-SCNC: 96 MMOL/L (ref 98–107)
CO2 SERPL-SCNC: 26 MMOL/L (ref 22–29)
CREAT SERPL-MCNC: 0.98 MG/DL (ref 0.57–1)
DEPRECATED RDW RBC AUTO: 43.8 FL (ref 37–54)
EOSINOPHIL # BLD AUTO: 0 10*3/MM3 (ref 0–0.4)
EOSINOPHIL NFR BLD AUTO: 0.7 % (ref 0.3–6.2)
ERYTHROCYTE [DISTWIDTH] IN BLOOD BY AUTOMATED COUNT: 13.2 % (ref 12.3–15.4)
GFR SERPL CREATININE-BSD FRML MDRD: 55 ML/MIN/1.73
GLUCOSE SERPL-MCNC: 102 MG/DL (ref 65–99)
HCT VFR BLD AUTO: 33.5 % (ref 34–46.6)
HGB BLD-MCNC: 11.3 G/DL (ref 12–15.9)
LYMPHOCYTES # BLD AUTO: 1.2 10*3/MM3 (ref 0.7–3.1)
LYMPHOCYTES NFR BLD AUTO: 19.2 % (ref 19.6–45.3)
MCH RBC QN AUTO: 32.1 PG (ref 26.6–33)
MCHC RBC AUTO-ENTMCNC: 33.7 G/DL (ref 31.5–35.7)
MCV RBC AUTO: 95.2 FL (ref 79–97)
MONOCYTES # BLD AUTO: 0.6 10*3/MM3 (ref 0.1–0.9)
MONOCYTES NFR BLD AUTO: 9.4 % (ref 5–12)
NEUTROPHILS NFR BLD AUTO: 4.6 10*3/MM3 (ref 1.7–7)
NEUTROPHILS NFR BLD AUTO: 70.5 % (ref 42.7–76)
NRBC BLD AUTO-RTO: 0 /100 WBC (ref 0–0.2)
PLATELET # BLD AUTO: 139 10*3/MM3 (ref 140–450)
PMV BLD AUTO: 9.7 FL (ref 6–12)
POTASSIUM SERPL-SCNC: 4.5 MMOL/L (ref 3.5–5.2)
RBC # BLD AUTO: 3.52 10*6/MM3 (ref 3.77–5.28)
SODIUM SERPL-SCNC: 129 MMOL/L (ref 136–145)
WBC # BLD AUTO: 6.5 10*3/MM3 (ref 3.4–10.8)

## 2020-12-16 PROCEDURE — 85025 COMPLETE CBC W/AUTO DIFF WBC: CPT | Performed by: UROLOGY

## 2020-12-16 PROCEDURE — 80048 BASIC METABOLIC PNL TOTAL CA: CPT | Performed by: UROLOGY

## 2020-12-17 ENCOUNTER — LAB REQUISITION (OUTPATIENT)
Dept: LAB | Facility: HOSPITAL | Age: 80
End: 2020-12-17

## 2020-12-17 DIAGNOSIS — N28.89 OTHER SPECIFIED DISORDERS OF KIDNEY AND URETER: ICD-10-CM

## 2020-12-17 LAB
ANION GAP SERPL CALCULATED.3IONS-SCNC: 6 MMOL/L (ref 5–15)
BASOPHILS # BLD AUTO: 0 10*3/MM3 (ref 0–0.2)
BASOPHILS NFR BLD AUTO: 0.3 % (ref 0–1.5)
BUN SERPL-MCNC: 8 MG/DL (ref 8–23)
BUN/CREAT SERPL: 9 (ref 7–25)
CALCIUM SPEC-SCNC: 8.8 MG/DL (ref 8.6–10.5)
CHLORIDE SERPL-SCNC: 101 MMOL/L (ref 98–107)
CO2 SERPL-SCNC: 26 MMOL/L (ref 22–29)
CREAT SERPL-MCNC: 0.89 MG/DL (ref 0.57–1)
DEPRECATED RDW RBC AUTO: 44.2 FL (ref 37–54)
EOSINOPHIL # BLD AUTO: 0.1 10*3/MM3 (ref 0–0.4)
EOSINOPHIL NFR BLD AUTO: 1.2 % (ref 0.3–6.2)
ERYTHROCYTE [DISTWIDTH] IN BLOOD BY AUTOMATED COUNT: 13.5 % (ref 12.3–15.4)
GFR SERPL CREATININE-BSD FRML MDRD: 61 ML/MIN/1.73
GLUCOSE SERPL-MCNC: 92 MG/DL (ref 65–99)
HCT VFR BLD AUTO: 32 % (ref 34–46.6)
HGB BLD-MCNC: 10.8 G/DL (ref 12–15.9)
LYMPHOCYTES # BLD AUTO: 1.2 10*3/MM3 (ref 0.7–3.1)
LYMPHOCYTES NFR BLD AUTO: 22.2 % (ref 19.6–45.3)
MCH RBC QN AUTO: 31.9 PG (ref 26.6–33)
MCHC RBC AUTO-ENTMCNC: 33.7 G/DL (ref 31.5–35.7)
MCV RBC AUTO: 94.5 FL (ref 79–97)
MONOCYTES # BLD AUTO: 0.6 10*3/MM3 (ref 0.1–0.9)
MONOCYTES NFR BLD AUTO: 10.3 % (ref 5–12)
NEUTROPHILS NFR BLD AUTO: 3.6 10*3/MM3 (ref 1.7–7)
NEUTROPHILS NFR BLD AUTO: 66 % (ref 42.7–76)
NRBC BLD AUTO-RTO: 0.1 /100 WBC (ref 0–0.2)
PLATELET # BLD AUTO: 135 10*3/MM3 (ref 140–450)
PMV BLD AUTO: 9.6 FL (ref 6–12)
POTASSIUM SERPL-SCNC: 4.8 MMOL/L (ref 3.5–5.2)
RBC # BLD AUTO: 3.38 10*6/MM3 (ref 3.77–5.28)
SODIUM SERPL-SCNC: 133 MMOL/L (ref 136–145)
WBC # BLD AUTO: 5.5 10*3/MM3 (ref 3.4–10.8)

## 2020-12-17 PROCEDURE — 85025 COMPLETE CBC W/AUTO DIFF WBC: CPT | Performed by: UROLOGY

## 2020-12-17 PROCEDURE — 80048 BASIC METABOLIC PNL TOTAL CA: CPT | Performed by: UROLOGY

## 2021-02-17 ENCOUNTER — OFFICE VISIT (OUTPATIENT)
Dept: CARDIOLOGY | Facility: CLINIC | Age: 81
End: 2021-02-17

## 2021-02-17 VITALS
SYSTOLIC BLOOD PRESSURE: 135 MMHG | HEART RATE: 60 BPM | RESPIRATION RATE: 18 BRPM | DIASTOLIC BLOOD PRESSURE: 67 MMHG | WEIGHT: 149 LBS | BODY MASS INDEX: 25.44 KG/M2 | OXYGEN SATURATION: 100 % | HEIGHT: 64 IN

## 2021-02-17 DIAGNOSIS — I10 ESSENTIAL HYPERTENSION: Primary | ICD-10-CM

## 2021-02-17 DIAGNOSIS — E78.2 MIXED HYPERLIPIDEMIA: ICD-10-CM

## 2021-02-17 PROCEDURE — 99213 OFFICE O/P EST LOW 20 MIN: CPT | Performed by: INTERNAL MEDICINE

## 2021-02-17 RX ORDER — VALSARTAN AND HYDROCHLOROTHIAZIDE 160; 12.5 MG/1; MG/1
TABLET, FILM COATED ORAL
COMMUNITY
Start: 2021-02-06

## 2021-02-17 NOTE — PROGRESS NOTES
"Cardiology Office Visit      Encounter Date:  02/17/2021    Patient ID:   Sarika Elder is a 81 y.o. female.    Reason For Followup:  Preop evaluation  Abnormal EKG    Brief Clinical History:  Dear Kalyan La IV, MD    I had the pleasure of seeing Sarika Elder today. As you are well aware, this is a 80 y.o. female with a past medical history that is significant for history of hypertension hyperlipidemia and family history of coronary artery disease presented to the office for further evaluation and treatment options for abnormal EKG and preop evaluation for nephrectomy    Interval History:  Patient has some nonspecific year and jaw discomfort  Also has some nonspecific chest discomfort in the past  No prior diagnosis of coronary artery disease no cardiac work-up or evaluation  Denies any typical exertional symptoms  Denies any orthopnea PND  No dizziness no syncope  Currently diagnosed with renal mass and currently being evaluated by possible nephrectomy  Surgery was postponed secondary to abnormal EKG    Assessment & Plan    Impressions:  Preop evaluation  Abnormal EKG  Hypertension  Hyperlipidemia  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 once a day  Follow-up with primary care physician  Patient is still not sure about getting Covid vaccine   Medical records and biopsy reports reviewed and discussed with the patient  Follow-up with cardiology as needed in future    Objective:    Vitals:  Vitals:    02/17/21 1415   BP: 135/67   BP Location: Left arm   Pulse: 60   Resp: 18   SpO2: 100%   Weight: 67.6 kg (149 lb)   Height: 162.6 cm (64\")       Physical Exam:    General: Alert, cooperative, no distress, appears stated age  Head:  Normocephalic, " atraumatic, mucous membranes moist  Eyes:  Conjunctiva/corneas clear, EOM's intact     Neck:  Supple,  no adenopathy;      Lungs: Clear to auscultation bilaterally, no wheezes rhonchi rales are noted  Chest wall: No tenderness  Heart::  Regular rate and rhythm, S1 and S2 normal, no murmur, rub or gallop  Abdomen: Soft, non-tender, nondistended bowel sounds active  Extremities: No cyanosis, clubbing, or edema  Pulses: 2+ and symmetric all extremities  Skin:  No rashes or lesions  Neuro/psych: A&O x3. CN II through XII are grossly intact with appropriate affect      Allergies:  No Known Allergies    Medication Review:     Current Outpatient Medications:   •  Calcium Carbonate-Vitamin D (OSCAL-500) 500-400 MG-UNIT per tablet, Take 1 tablet by mouth Daily., Disp: , Rfl:   •  gabapentin (NEURONTIN) 300 MG capsule, Two or three cap at night, Disp: 270 capsule, Rfl: 3  •  LORazepam (ATIVAN) 0.5 MG tablet, TAKE 1/2 TO 1 TABLET BY MOUTH EVERY DAY AS NEEDED FOR NERVES *MAX 1 TABLET DAILY, Disp: , Rfl: 2  •  rosuvastatin (CRESTOR) 20 MG tablet, , Disp: , Rfl:   •  valsartan-hydrochlorothiazide (DIOVAN-HCT) 160-12.5 MG per tablet, , Disp: , Rfl:     Family History:  History reviewed. No pertinent family history.    Past Medical History:  Past Medical History:   Diagnosis Date   • Cancer (CMS/HCC)    • Hyperlipidemia    • Hypertension    • Peripheral neuropathy        Past surgical History:  Past Surgical History:   Procedure Laterality Date   • GALLBLADDER SURGERY     • HYSTERECTOMY     • KIDNEY SURGERY      Surgery to remove cancer        Social History:  Social History     Socioeconomic History   • Marital status:      Spouse name: Not on file   • Number of children: Not on file   • Years of education: Not on file   • Highest education level: Not on file   Tobacco Use   • Smoking status: Never Smoker   • Smokeless tobacco: Never Used   Substance and Sexual Activity   • Alcohol use: No     Frequency: Never   • Drug use:  No   • Sexual activity: Defer       Review of Systems:  The following systems were reviewed as they relate to the cardiovascular system: Constitutional, Eyes, ENT, Cardiovascular, Respiratory, Gastrointestinal, Integumentary, Neurological, Psychiatric, Hematologic, Endocrine, Musculoskeletal, and Genitourinary. The pertinent cardiovascular findings are reported above with all other cardiovascular points within those systems being negative.    Diagnostic Study Review:     Current Electrocardiogram:  Procedures      NOTE: The following portions of the patient's history were reviewed and updated this visit as appropriate: allergies, current medications, past family history, past medical history, past social history, past surgical history and problem list.

## 2021-04-01 ENCOUNTER — HOSPITAL ENCOUNTER (EMERGENCY)
Facility: HOSPITAL | Age: 81
Discharge: HOME | End: 2021-04-01
Attending: EMERGENCY MEDICINE | Admitting: EMERGENCY MEDICINE

## 2021-04-01 ENCOUNTER — APPOINTMENT (OUTPATIENT)
Dept: CT IMAGING | Facility: HOSPITAL | Age: 81
End: 2021-04-01

## 2021-04-01 VITALS
RESPIRATION RATE: 18 BRPM | BODY MASS INDEX: 26.31 KG/M2 | TEMPERATURE: 97.8 F | WEIGHT: 154.1 LBS | OXYGEN SATURATION: 100 % | DIASTOLIC BLOOD PRESSURE: 66 MMHG | HEIGHT: 64 IN | SYSTOLIC BLOOD PRESSURE: 158 MMHG | HEART RATE: 62 BPM

## 2021-04-01 DIAGNOSIS — I10 ESSENTIAL HYPERTENSION: Primary | ICD-10-CM

## 2021-04-01 DIAGNOSIS — R20.2 PARESTHESIA: ICD-10-CM

## 2021-04-01 DIAGNOSIS — R51.9 ACUTE NONINTRACTABLE HEADACHE, UNSPECIFIED HEADACHE TYPE: ICD-10-CM

## 2021-04-01 LAB
ANION GAP SERPL CALCULATED.3IONS-SCNC: 8 MMOL/L (ref 5–15)
BASOPHILS # BLD AUTO: 0 10*3/MM3 (ref 0–0.2)
BASOPHILS NFR BLD AUTO: 0.5 % (ref 0–1.5)
BUN SERPL-MCNC: 25 MG/DL (ref 8–23)
BUN/CREAT SERPL: 24.8 (ref 7–25)
CALCIUM SPEC-SCNC: 9.3 MG/DL (ref 8.6–10.5)
CHLORIDE SERPL-SCNC: 104 MMOL/L (ref 98–107)
CO2 SERPL-SCNC: 28 MMOL/L (ref 22–29)
CREAT SERPL-MCNC: 1.01 MG/DL (ref 0.57–1)
DEPRECATED RDW RBC AUTO: 45.1 FL (ref 37–54)
EOSINOPHIL # BLD AUTO: 0.1 10*3/MM3 (ref 0–0.4)
EOSINOPHIL NFR BLD AUTO: 1.5 % (ref 0.3–6.2)
ERYTHROCYTE [DISTWIDTH] IN BLOOD BY AUTOMATED COUNT: 14 % (ref 12.3–15.4)
GFR SERPL CREATININE-BSD FRML MDRD: 53 ML/MIN/1.73
GLUCOSE SERPL-MCNC: 117 MG/DL (ref 65–99)
HCT VFR BLD AUTO: 34.7 % (ref 34–46.6)
HGB BLD-MCNC: 11.9 G/DL (ref 12–15.9)
HOLD SPECIMEN: NORMAL
LYMPHOCYTES # BLD AUTO: 1.8 10*3/MM3 (ref 0.7–3.1)
LYMPHOCYTES NFR BLD AUTO: 33.8 % (ref 19.6–45.3)
MCH RBC QN AUTO: 31.5 PG (ref 26.6–33)
MCHC RBC AUTO-ENTMCNC: 34.3 G/DL (ref 31.5–35.7)
MCV RBC AUTO: 92 FL (ref 79–97)
MONOCYTES # BLD AUTO: 0.5 10*3/MM3 (ref 0.1–0.9)
MONOCYTES NFR BLD AUTO: 8.9 % (ref 5–12)
NEUTROPHILS NFR BLD AUTO: 2.9 10*3/MM3 (ref 1.7–7)
NEUTROPHILS NFR BLD AUTO: 55.3 % (ref 42.7–76)
NRBC BLD AUTO-RTO: 0.1 /100 WBC (ref 0–0.2)
PLATELET # BLD AUTO: 156 10*3/MM3 (ref 140–450)
PMV BLD AUTO: 8.8 FL (ref 6–12)
POTASSIUM SERPL-SCNC: 3.7 MMOL/L (ref 3.5–5.2)
RBC # BLD AUTO: 3.78 10*6/MM3 (ref 3.77–5.28)
SODIUM SERPL-SCNC: 140 MMOL/L (ref 136–145)
TROPONIN T SERPL-MCNC: <0.01 NG/ML (ref 0–0.03)
WBC # BLD AUTO: 5.2 10*3/MM3 (ref 3.4–10.8)
WHOLE BLOOD HOLD SPECIMEN: NORMAL

## 2021-04-01 PROCEDURE — 25010000002 KETOROLAC TROMETHAMINE PER 15 MG: Performed by: EMERGENCY MEDICINE

## 2021-04-01 PROCEDURE — 80048 BASIC METABOLIC PNL TOTAL CA: CPT | Performed by: EMERGENCY MEDICINE

## 2021-04-01 PROCEDURE — 84484 ASSAY OF TROPONIN QUANT: CPT | Performed by: EMERGENCY MEDICINE

## 2021-04-01 PROCEDURE — 93005 ELECTROCARDIOGRAM TRACING: CPT | Performed by: EMERGENCY MEDICINE

## 2021-04-01 PROCEDURE — 85025 COMPLETE CBC W/AUTO DIFF WBC: CPT | Performed by: EMERGENCY MEDICINE

## 2021-04-01 PROCEDURE — 96374 THER/PROPH/DIAG INJ IV PUSH: CPT

## 2021-04-01 PROCEDURE — 70450 CT HEAD/BRAIN W/O DYE: CPT

## 2021-04-01 PROCEDURE — 99284 EMERGENCY DEPT VISIT MOD MDM: CPT

## 2021-04-01 RX ORDER — SODIUM CHLORIDE 0.9 % (FLUSH) 0.9 %
10 SYRINGE (ML) INJECTION AS NEEDED
Status: DISCONTINUED | OUTPATIENT
Start: 2021-04-01 | End: 2021-04-02 | Stop reason: HOSPADM

## 2021-04-01 RX ORDER — KETOROLAC TROMETHAMINE 15 MG/ML
15 INJECTION, SOLUTION INTRAMUSCULAR; INTRAVENOUS ONCE
Status: COMPLETED | OUTPATIENT
Start: 2021-04-01 | End: 2021-04-01

## 2021-04-01 RX ADMIN — KETOROLAC TROMETHAMINE 15 MG: 15 INJECTION, SOLUTION INTRAMUSCULAR; INTRAVENOUS at 21:49

## 2021-04-02 NOTE — DISCHARGE INSTRUCTIONS
Follow-up with PMD or cardiologist for recheck, keep a blood pressure diary checking blood pressure twice a day, return new or worsening symptoms

## 2021-04-05 LAB — QT INTERVAL: 415 MS

## 2021-04-13 ENCOUNTER — TRANSCRIBE ORDERS (OUTPATIENT)
Dept: ADMINISTRATIVE | Facility: HOSPITAL | Age: 81
End: 2021-04-13

## 2021-04-13 DIAGNOSIS — Z13.820 SCREENING FOR OSTEOPOROSIS: ICD-10-CM

## 2021-04-13 DIAGNOSIS — M85.80 OTHER SPECIFIED DISORDERS OF BONE DENSITY AND STRUCTURE, UNSPECIFIED SITE: ICD-10-CM

## 2021-04-13 DIAGNOSIS — Z12.31 VISIT FOR SCREENING MAMMOGRAM: Primary | ICD-10-CM

## 2021-06-10 ENCOUNTER — HOSPITAL ENCOUNTER (OUTPATIENT)
Dept: BONE DENSITY | Facility: HOSPITAL | Age: 81
Discharge: HOME | End: 2021-06-10

## 2021-06-10 ENCOUNTER — HOSPITAL ENCOUNTER (OUTPATIENT)
Dept: MAMMOGRAPHY | Facility: HOSPITAL | Age: 81
Discharge: HOME | End: 2021-06-10

## 2021-06-10 DIAGNOSIS — Z13.820 SCREENING FOR OSTEOPOROSIS: ICD-10-CM

## 2021-06-10 DIAGNOSIS — Z12.31 VISIT FOR SCREENING MAMMOGRAM: ICD-10-CM

## 2021-06-10 DIAGNOSIS — M85.80 OTHER SPECIFIED DISORDERS OF BONE DENSITY AND STRUCTURE, UNSPECIFIED SITE: ICD-10-CM

## 2021-06-10 PROCEDURE — 77067 SCR MAMMO BI INCL CAD: CPT

## 2021-06-10 PROCEDURE — 77080 DXA BONE DENSITY AXIAL: CPT

## 2021-06-10 PROCEDURE — 77063 BREAST TOMOSYNTHESIS BI: CPT

## 2022-06-02 ENCOUNTER — TRANSCRIBE ORDERS (OUTPATIENT)
Dept: ADMINISTRATIVE | Facility: HOSPITAL | Age: 82
End: 2022-06-02

## 2022-06-02 DIAGNOSIS — Z12.31 ENCOUNTER FOR SCREENING MAMMOGRAM FOR MALIGNANT NEOPLASM OF BREAST: Primary | ICD-10-CM

## 2022-06-29 ENCOUNTER — HOSPITAL ENCOUNTER (OUTPATIENT)
Dept: MAMMOGRAPHY | Facility: HOSPITAL | Age: 82
Discharge: HOME | End: 2022-06-29
Admitting: OBSTETRICS & GYNECOLOGY

## 2022-06-29 ENCOUNTER — APPOINTMENT (OUTPATIENT)
Dept: MAMMOGRAPHY | Facility: HOSPITAL | Age: 82
End: 2022-06-29

## 2022-06-29 DIAGNOSIS — Z12.31 ENCOUNTER FOR SCREENING MAMMOGRAM FOR MALIGNANT NEOPLASM OF BREAST: ICD-10-CM

## 2022-06-29 PROCEDURE — 77067 SCR MAMMO BI INCL CAD: CPT

## 2022-06-29 PROCEDURE — 77063 BREAST TOMOSYNTHESIS BI: CPT

## 2022-07-06 ENCOUNTER — HOSPITAL ENCOUNTER (OUTPATIENT)
Dept: MAMMOGRAPHY | Facility: HOSPITAL | Age: 82
Discharge: HOME | End: 2022-07-06

## 2022-07-06 ENCOUNTER — HOSPITAL ENCOUNTER (OUTPATIENT)
Dept: ULTRASOUND IMAGING | Facility: HOSPITAL | Age: 82
Discharge: HOME | End: 2022-07-06

## 2022-07-06 DIAGNOSIS — N64.89 BREAST ASYMMETRY: ICD-10-CM

## 2022-07-06 PROCEDURE — 77065 DX MAMMO INCL CAD UNI: CPT

## 2022-07-06 PROCEDURE — G0279 TOMOSYNTHESIS, MAMMO: HCPCS

## 2022-08-12 PROBLEM — Z86.010 PERSONAL HISTORY OF COLONIC POLYPS: Status: ACTIVE | Noted: 2019-07-17

## 2022-09-20 ENCOUNTER — ON CAMPUS - OUTPATIENT (AMBULATORY)
Dept: URBAN - METROPOLITAN AREA HOSPITAL 2 | Facility: HOSPITAL | Age: 82
End: 2022-09-20
Payer: MEDICARE

## 2022-09-20 VITALS
HEART RATE: 58 BPM | DIASTOLIC BLOOD PRESSURE: 60 MMHG | OXYGEN SATURATION: 100 % | DIASTOLIC BLOOD PRESSURE: 65 MMHG | WEIGHT: 141.6 LBS | DIASTOLIC BLOOD PRESSURE: 54 MMHG | SYSTOLIC BLOOD PRESSURE: 117 MMHG | OXYGEN SATURATION: 99 % | HEART RATE: 59 BPM | DIASTOLIC BLOOD PRESSURE: 78 MMHG | SYSTOLIC BLOOD PRESSURE: 131 MMHG | SYSTOLIC BLOOD PRESSURE: 102 MMHG | HEART RATE: 55 BPM | RESPIRATION RATE: 17 BRPM | SYSTOLIC BLOOD PRESSURE: 130 MMHG | HEART RATE: 56 BPM | HEIGHT: 65 IN | SYSTOLIC BLOOD PRESSURE: 109 MMHG | SYSTOLIC BLOOD PRESSURE: 136 MMHG | SYSTOLIC BLOOD PRESSURE: 125 MMHG | DIASTOLIC BLOOD PRESSURE: 64 MMHG | HEART RATE: 60 BPM | DIASTOLIC BLOOD PRESSURE: 72 MMHG | OXYGEN SATURATION: 98 % | DIASTOLIC BLOOD PRESSURE: 53 MMHG | TEMPERATURE: 97.5 F | RESPIRATION RATE: 16 BRPM

## 2022-09-20 DIAGNOSIS — K57.30 DIVERTICULOSIS OF LARGE INTESTINE WITHOUT PERFORATION OR ABS: ICD-10-CM

## 2022-09-20 DIAGNOSIS — Z86.010 PERSONAL HISTORY OF COLONIC POLYPS: ICD-10-CM

## 2022-09-20 PROCEDURE — G0105 COLORECTAL SCRN; HI RISK IND: HCPCS | Performed by: INTERNAL MEDICINE

## 2023-06-02 ENCOUNTER — TRANSCRIBE ORDERS (OUTPATIENT)
Dept: ADMINISTRATIVE | Facility: HOSPITAL | Age: 83
End: 2023-06-02

## 2023-06-02 DIAGNOSIS — Z12.31 SCREENING MAMMOGRAM FOR BREAST CANCER: Primary | ICD-10-CM

## 2023-07-09 NOTE — ED PROVIDER NOTES
Subjective   History of Present Illness  Context: 81-year-old female presents with complaints of some headache.  She describes it as frontal in nature.  She had some feeling of pressure in her left arm arm felt like it was asleep during the night last night.  She has had kind of persistent symptoms since then.  She reports no chest pain or shortness of breath.  She took Tylenol last night but it did not help so she did not take anything today.  She complains of some numbness in her lower legs but has neuropathy and there is nothing new with this.  She is having no complaints of new visual difficulty.  She states her eyes get tired and sunlight bothers her but this is not new.  She has had no fever.  No cough.  No nausea vomiting no diaphoresis.  She states she did have some belching.  She reports she took her blood pressure at home.  Her highest blood pressure was 172/91 subsequent readings were improved.  Location: Head  Quality: Pain  Duration: Onset last night  Timing: Constant  Severity: Mild to moderate   Modifying factors: Tried Tylenol without relief  Associated signs and symptoms: States had some feeling of pressure left arm feeling asleep.  She states this has been fairly persistent since yesterday.    Review of Systems  Negative for fever cough vomiting diarrhea positive for neuropathy  Past Medical History:   Diagnosis Date   • Cancer (CMS/HCC)    • Hyperlipidemia    • Hypertension    • Peripheral neuropathy        Allergies   Allergen Reactions   • Latex Hives       Past Surgical History:   Procedure Laterality Date   • GALLBLADDER SURGERY     • HYSTERECTOMY     • KIDNEY SURGERY      Surgery to remove cancer        No family history on file.    Social History     Socioeconomic History   • Marital status:      Spouse name: Not on file   • Number of children: Not on file   • Years of education: Not on file   • Highest education level: Not on file   Tobacco Use   • Smoking status: Never Smoker   •  Smokeless tobacco: Never Used   Substance and Sexual Activity   • Alcohol use: No   • Drug use: No   • Sexual activity: Defer     Prior to Admission medications    Medication Sig Start Date End Date Taking? Authorizing Provider   Calcium Carbonate-Vitamin D (OSCAL-500) 500-400 MG-UNIT per tablet Take 1 tablet by mouth Daily.    Brando Owens MD   gabapentin (NEURONTIN) 300 MG capsule Two or three cap at night 1/3/20   Seipel, Joseph F, MD   LORazepam (ATIVAN) 0.5 MG tablet TAKE 1/2 TO 1 TABLET BY MOUTH EVERY DAY AS NEEDED FOR NERVES *MAX 1 TABLET DAILY 9/4/19   Brando Owens MD   rosuvastatin (CRESTOR) 20 MG tablet  6/17/19   Brando Owens MD   valsartan-hydrochlorothiazide (DIOVAN-HCT) 160-12.5 MG per tablet  2/6/21   Brando Owens MD           Objective   Physical Exam  81-year-old female awake alert.  Generally well-developed well-nourished.  Pupils equal round at light.  Visual fields full.  She has no temporal tenderness.  There is no facial asymmetry.  Oropharynx clear.  Neck supple.  Chest clear equal breath sounds.  Cardiovascular regular rhythm.  Abdomen soft nontender.  Neurologic exam cranial 2 through 12 grossly intact hands without pronator drift finger-nose intact speech clear motor sensory grossly intact to extremities.  She has NIH stroke scale of 0.  Procedures           ED Course      Results for orders placed or performed during the hospital encounter of 04/01/21   Basic Metabolic Panel    Specimen: Blood   Result Value Ref Range    Glucose 117 (H) 65 - 99 mg/dL    BUN 25 (H) 8 - 23 mg/dL    Creatinine 1.01 (H) 0.57 - 1.00 mg/dL    Sodium 140 136 - 145 mmol/L    Potassium 3.7 3.5 - 5.2 mmol/L    Chloride 104 98 - 107 mmol/L    CO2 28.0 22.0 - 29.0 mmol/L    Calcium 9.3 8.6 - 10.5 mg/dL    eGFR Non African Amer 53 (L) >60 mL/min/1.73    BUN/Creatinine Ratio 24.8 7.0 - 25.0    Anion Gap 8.0 5.0 - 15.0 mmol/L   Troponin    Specimen: Blood   Result Value Ref Range     "Troponin T <0.010 0.000 - 0.030 ng/mL   CBC Auto Differential    Specimen: Blood   Result Value Ref Range    WBC 5.20 3.40 - 10.80 10*3/mm3    RBC 3.78 3.77 - 5.28 10*6/mm3    Hemoglobin 11.9 (L) 12.0 - 15.9 g/dL    Hematocrit 34.7 34.0 - 46.6 %    MCV 92.0 79.0 - 97.0 fL    MCH 31.5 26.6 - 33.0 pg    MCHC 34.3 31.5 - 35.7 g/dL    RDW 14.0 12.3 - 15.4 %    RDW-SD 45.1 37.0 - 54.0 fl    MPV 8.8 6.0 - 12.0 fL    Platelets 156 140 - 450 10*3/mm3    Neutrophil % 55.3 42.7 - 76.0 %    Lymphocyte % 33.8 19.6 - 45.3 %    Monocyte % 8.9 5.0 - 12.0 %    Eosinophil % 1.5 0.3 - 6.2 %    Basophil % 0.5 0.0 - 1.5 %    Neutrophils, Absolute 2.90 1.70 - 7.00 10*3/mm3    Lymphocytes, Absolute 1.80 0.70 - 3.10 10*3/mm3    Monocytes, Absolute 0.50 0.10 - 0.90 10*3/mm3    Eosinophils, Absolute 0.10 0.00 - 0.40 10*3/mm3    Basophils, Absolute 0.00 0.00 - 0.20 10*3/mm3    nRBC 0.1 0.0 - 0.2 /100 WBC   ECG 12 Lead   Result Value Ref Range    QT Interval 415 ms     CT Head Without Contrast    Result Date: 4/1/2021  1. No acute abnormalities identified. Negative for hemorrhage or mass effect. 2. Moderately severe white matter abnormality. This is nonspecific and can be due to chronic small vessel ischemia as well as to hypertension, vasculitis, or other pathology. 3. Left maxillary sinusitis.  Electronically Signed By-Cleopatra Johnson MD On:4/1/2021 10:19 PM This report was finalized on [id number] by  Cleopatra Johnson MD.    Medications   sodium chloride 0.9 % flush 10 mL (has no administration in time range)   ketorolac (TORADOL) injection 15 mg (15 mg Intravenous Given 4/1/21 2149)     /81   Pulse 70   Temp 97.8 °F (36.6 °C) (Oral)   Resp 18   Ht 162.6 cm (64\")   Wt 69.9 kg (154 lb 1.6 oz)   SpO2 100%   BMI 26.45 kg/m²                                        MDM  Chart review: Patient has had outpatient office visits for hypertension hyperlipidemia and idiopathic peripheral neuropathy with neurology.  She was noted to have " stress Myoview November of last year that showed normal myocardial perfusion with no active ischemia ejection fraction 70%.  Comorbidity: As per past history  Differential: Cephalgia, neuropathy, MI, hypertension  My EKG interpretation: Sinus rhythm without acute abnormality or significant change from previous  Lab: Essentially normal CBC hemoglobin 11.9 basic metabolic panel glucose 117 BUN 25 creatinine 1.0 troponin less than 0.01.  Radiology: CT scan of head shows no acute intracranial abnormality.  There is moderate severe white matter microvascular disease.  There is some left maxillary sinusitis with small amount of fluid noted.  Discussion/treatment: Patient IV placed.  Was given dose of ketorolac.  Repeat evaluation she is feeling improved.  Findings were discussed with her.  With normal troponin and symptoms having started last night it was not felt this was a cardiac etiology.  She does have history of hypertension hyperlipidemia.  Advised would not adjust medications off her readings today by themselves.  Advised that she should keep blood pressure diary checking blood pressure twice a day to follow-up with her PMD next week, return new or worsening symptoms.  Patient was evaluated using appropriate PPE      Final diagnoses:   Essential hypertension   Acute nonintractable headache, unspecified headache type   Paresthesia       ED Disposition  ED Disposition     ED Disposition Condition Comment    Discharge Stable           No follow-up provider specified.       Medication List      No changes were made to your prescriptions during this visit.          Memo Acosta MD  04/02/21 0003     0 = swallows foods/liquids without difficulty

## 2024-06-06 ENCOUNTER — TRANSCRIBE ORDERS (OUTPATIENT)
Dept: ADMINISTRATIVE | Facility: HOSPITAL | Age: 84
End: 2024-06-06
Payer: MEDICARE

## 2024-06-06 DIAGNOSIS — Z12.31 ENCOUNTER FOR SCREENING MAMMOGRAM FOR BREAST CANCER: Primary | ICD-10-CM

## 2024-07-15 ENCOUNTER — HOSPITAL ENCOUNTER (OUTPATIENT)
Dept: MAMMOGRAPHY | Facility: HOSPITAL | Age: 84
Discharge: HOME | End: 2024-07-15
Admitting: FAMILY MEDICINE
Payer: MEDICARE

## 2024-07-15 DIAGNOSIS — Z12.31 ENCOUNTER FOR SCREENING MAMMOGRAM FOR BREAST CANCER: ICD-10-CM

## 2024-07-15 PROCEDURE — 77063 BREAST TOMOSYNTHESIS BI: CPT

## 2024-07-15 PROCEDURE — 77067 SCR MAMMO BI INCL CAD: CPT

## 2025-01-15 ENCOUNTER — HOSPITAL ENCOUNTER (INPATIENT)
Facility: HOSPITAL | Age: 85
LOS: 1 days | Discharge: HOME | End: 2025-01-16
Attending: EMERGENCY MEDICINE
Payer: MEDICARE

## 2025-01-15 ENCOUNTER — APPOINTMENT (OUTPATIENT)
Dept: CT IMAGING | Facility: HOSPITAL | Age: 85
End: 2025-01-15
Payer: MEDICARE

## 2025-01-15 ENCOUNTER — APPOINTMENT (OUTPATIENT)
Dept: GENERAL RADIOLOGY | Facility: HOSPITAL | Age: 85
End: 2025-01-15
Payer: MEDICARE

## 2025-01-15 DIAGNOSIS — R29.818 FOCAL NEUROLOGICAL DEFICIT: Primary | ICD-10-CM

## 2025-01-15 LAB
ALBUMIN SERPL-MCNC: 4 G/DL (ref 3.5–5.2)
ALBUMIN/GLOB SERPL: 1.2 G/DL
ALP SERPL-CCNC: 91 U/L (ref 39–117)
ALT SERPL W P-5'-P-CCNC: 20 U/L (ref 1–33)
ANION GAP SERPL CALCULATED.3IONS-SCNC: 11.1 MMOL/L (ref 5–15)
APTT PPP: 22.8 SECONDS (ref 22.7–35.4)
AST SERPL-CCNC: 31 U/L (ref 1–32)
BASOPHILS # BLD AUTO: 0.02 10*3/MM3 (ref 0–0.2)
BASOPHILS NFR BLD AUTO: 0.2 % (ref 0–1.5)
BILIRUB SERPL-MCNC: 0.4 MG/DL (ref 0–1.2)
BUN SERPL-MCNC: 19 MG/DL (ref 8–23)
BUN/CREAT SERPL: 13.7 (ref 7–25)
CALCIUM SPEC-SCNC: 9.4 MG/DL (ref 8.6–10.5)
CHLORIDE SERPL-SCNC: 95 MMOL/L (ref 98–107)
CO2 SERPL-SCNC: 27.9 MMOL/L (ref 22–29)
CREAT SERPL-MCNC: 1.39 MG/DL (ref 0.57–1)
DEPRECATED RDW RBC AUTO: 41.5 FL (ref 37–54)
EGFRCR SERPLBLD CKD-EPI 2021: 37.5 ML/MIN/1.73
EOSINOPHIL # BLD AUTO: 0.04 10*3/MM3 (ref 0–0.4)
EOSINOPHIL NFR BLD AUTO: 0.4 % (ref 0.3–6.2)
ERYTHROCYTE [DISTWIDTH] IN BLOOD BY AUTOMATED COUNT: 12.6 % (ref 12.3–15.4)
GLOBULIN UR ELPH-MCNC: 3.4 GM/DL
GLUCOSE BLDC GLUCOMTR-MCNC: 137 MG/DL (ref 70–105)
GLUCOSE SERPL-MCNC: 107 MG/DL (ref 65–99)
HCT VFR BLD AUTO: 38 % (ref 34–46.6)
HGB BLD-MCNC: 12.8 G/DL (ref 12–15.9)
HOLD SPECIMEN: NORMAL
HOLD SPECIMEN: NORMAL
IMM GRANULOCYTES # BLD AUTO: 0.04 10*3/MM3 (ref 0–0.05)
IMM GRANULOCYTES NFR BLD AUTO: 0.4 % (ref 0–0.5)
INR PPP: 0.98 (ref 0.9–1.1)
LYMPHOCYTES # BLD AUTO: 1.71 10*3/MM3 (ref 0.7–3.1)
LYMPHOCYTES NFR BLD AUTO: 17.7 % (ref 19.6–45.3)
MCH RBC QN AUTO: 30.5 PG (ref 26.6–33)
MCHC RBC AUTO-ENTMCNC: 33.7 G/DL (ref 31.5–35.7)
MCV RBC AUTO: 90.5 FL (ref 79–97)
MONOCYTES # BLD AUTO: 0.82 10*3/MM3 (ref 0.1–0.9)
MONOCYTES NFR BLD AUTO: 8.5 % (ref 5–12)
NEUTROPHILS NFR BLD AUTO: 7.03 10*3/MM3 (ref 1.7–7)
NEUTROPHILS NFR BLD AUTO: 72.8 % (ref 42.7–76)
NRBC BLD AUTO-RTO: 0 /100 WBC (ref 0–0.2)
PLATELET # BLD AUTO: 234 10*3/MM3 (ref 140–450)
PMV BLD AUTO: 10.3 FL (ref 6–12)
POTASSIUM SERPL-SCNC: 4 MMOL/L (ref 3.5–5.2)
PROT SERPL-MCNC: 7.4 G/DL (ref 6–8.5)
PROTHROMBIN TIME: 13.1 SECONDS (ref 11.7–14.2)
RBC # BLD AUTO: 4.2 10*6/MM3 (ref 3.77–5.28)
SODIUM SERPL-SCNC: 134 MMOL/L (ref 136–145)
WBC NRBC COR # BLD AUTO: 9.66 10*3/MM3 (ref 3.4–10.8)
WHOLE BLOOD HOLD COAG: NORMAL
WHOLE BLOOD HOLD SPECIMEN: NORMAL

## 2025-01-15 PROCEDURE — 85730 THROMBOPLASTIN TIME PARTIAL: CPT | Performed by: EMERGENCY MEDICINE

## 2025-01-15 PROCEDURE — 71045 X-RAY EXAM CHEST 1 VIEW: CPT

## 2025-01-15 PROCEDURE — 70498 CT ANGIOGRAPHY NECK: CPT

## 2025-01-15 PROCEDURE — 4A03X5D MEASUREMENT OF ARTERIAL FLOW, INTRACRANIAL, EXTERNAL APPROACH: ICD-10-PCS | Performed by: RADIOLOGY

## 2025-01-15 PROCEDURE — 82948 REAGENT STRIP/BLOOD GLUCOSE: CPT

## 2025-01-15 PROCEDURE — 80053 COMPREHEN METABOLIC PANEL: CPT | Performed by: EMERGENCY MEDICINE

## 2025-01-15 PROCEDURE — 25510000001 IOPAMIDOL PER 1 ML: Performed by: EMERGENCY MEDICINE

## 2025-01-15 PROCEDURE — 93005 ELECTROCARDIOGRAM TRACING: CPT | Performed by: EMERGENCY MEDICINE

## 2025-01-15 PROCEDURE — 0042T HC CT CEREBRAL PERFUSION W/WO CONTRAST: CPT

## 2025-01-15 PROCEDURE — 36415 COLL VENOUS BLD VENIPUNCTURE: CPT

## 2025-01-15 PROCEDURE — 99291 CRITICAL CARE FIRST HOUR: CPT

## 2025-01-15 PROCEDURE — 70450 CT HEAD/BRAIN W/O DYE: CPT

## 2025-01-15 PROCEDURE — 85610 PROTHROMBIN TIME: CPT | Performed by: EMERGENCY MEDICINE

## 2025-01-15 PROCEDURE — 85025 COMPLETE CBC W/AUTO DIFF WBC: CPT | Performed by: EMERGENCY MEDICINE

## 2025-01-15 PROCEDURE — 70496 CT ANGIOGRAPHY HEAD: CPT

## 2025-01-15 RX ORDER — BISACODYL 10 MG
10 SUPPOSITORY, RECTAL RECTAL DAILY PRN
Status: DISCONTINUED | OUTPATIENT
Start: 2025-01-15 | End: 2025-01-16 | Stop reason: HOSPADM

## 2025-01-15 RX ORDER — CLOPIDOGREL BISULFATE 75 MG/1
75 TABLET ORAL DAILY
Status: DISCONTINUED | OUTPATIENT
Start: 2025-01-17 | End: 2025-01-16

## 2025-01-15 RX ORDER — SODIUM CHLORIDE 0.9 % (FLUSH) 0.9 %
10 SYRINGE (ML) INJECTION AS NEEDED
Status: DISCONTINUED | OUTPATIENT
Start: 2025-01-15 | End: 2025-01-16 | Stop reason: HOSPADM

## 2025-01-15 RX ORDER — IOPAMIDOL 755 MG/ML
50 INJECTION, SOLUTION INTRAVASCULAR
Status: COMPLETED | OUTPATIENT
Start: 2025-01-16 | End: 2025-01-15

## 2025-01-15 RX ORDER — ONDANSETRON 2 MG/ML
4 INJECTION INTRAMUSCULAR; INTRAVENOUS EVERY 6 HOURS PRN
Status: DISCONTINUED | OUTPATIENT
Start: 2025-01-15 | End: 2025-01-16 | Stop reason: HOSPADM

## 2025-01-15 RX ORDER — SODIUM CHLORIDE 0.9 % (FLUSH) 0.9 %
10 SYRINGE (ML) INJECTION EVERY 12 HOURS SCHEDULED
Status: DISCONTINUED | OUTPATIENT
Start: 2025-01-16 | End: 2025-01-16 | Stop reason: HOSPADM

## 2025-01-15 RX ORDER — ALUMINA, MAGNESIA, AND SIMETHICONE 2400; 2400; 240 MG/30ML; MG/30ML; MG/30ML
7.5 SUSPENSION ORAL EVERY 4 HOURS PRN
Status: DISCONTINUED | OUTPATIENT
Start: 2025-01-15 | End: 2025-01-16 | Stop reason: HOSPADM

## 2025-01-15 RX ORDER — ASPIRIN 300 MG/1
300 SUPPOSITORY RECTAL DAILY
Status: DISCONTINUED | OUTPATIENT
Start: 2025-01-16 | End: 2025-01-16

## 2025-01-15 RX ORDER — ASPIRIN 81 MG/1
81 TABLET, CHEWABLE ORAL DAILY
Status: DISCONTINUED | OUTPATIENT
Start: 2025-01-16 | End: 2025-01-16 | Stop reason: HOSPADM

## 2025-01-15 RX ORDER — SODIUM CHLORIDE 9 MG/ML
40 INJECTION, SOLUTION INTRAVENOUS AS NEEDED
Status: DISCONTINUED | OUTPATIENT
Start: 2025-01-15 | End: 2025-01-16 | Stop reason: HOSPADM

## 2025-01-15 RX ORDER — ATORVASTATIN CALCIUM 40 MG/1
80 TABLET, FILM COATED ORAL NIGHTLY
Status: DISCONTINUED | OUTPATIENT
Start: 2025-01-16 | End: 2025-01-16

## 2025-01-15 RX ORDER — ACETAMINOPHEN 650 MG/1
650 SUPPOSITORY RECTAL EVERY 4 HOURS PRN
Status: DISCONTINUED | OUTPATIENT
Start: 2025-01-15 | End: 2025-01-16

## 2025-01-15 RX ORDER — ACETAMINOPHEN 325 MG/1
650 TABLET ORAL EVERY 4 HOURS PRN
Status: DISCONTINUED | OUTPATIENT
Start: 2025-01-15 | End: 2025-01-16 | Stop reason: HOSPADM

## 2025-01-15 RX ORDER — IOPAMIDOL 755 MG/ML
100 INJECTION, SOLUTION INTRAVASCULAR
Status: COMPLETED | OUTPATIENT
Start: 2025-01-16 | End: 2025-01-15

## 2025-01-15 RX ORDER — CLOPIDOGREL 300 MG/1
300 TABLET, FILM COATED ORAL ONCE
Status: DISCONTINUED | OUTPATIENT
Start: 2025-01-16 | End: 2025-01-16

## 2025-01-15 RX ADMIN — IOPAMIDOL 50 ML: 755 INJECTION, SOLUTION INTRAVENOUS at 23:44

## 2025-01-15 RX ADMIN — IOPAMIDOL 100 ML: 755 INJECTION, SOLUTION INTRAVENOUS at 23:45

## 2025-01-16 ENCOUNTER — APPOINTMENT (OUTPATIENT)
Dept: MRI IMAGING | Facility: HOSPITAL | Age: 85
End: 2025-01-16
Payer: MEDICARE

## 2025-01-16 ENCOUNTER — APPOINTMENT (OUTPATIENT)
Dept: CARDIOLOGY | Facility: HOSPITAL | Age: 85
End: 2025-01-16
Payer: MEDICARE

## 2025-01-16 VITALS
DIASTOLIC BLOOD PRESSURE: 66 MMHG | OXYGEN SATURATION: 95 % | HEIGHT: 64 IN | TEMPERATURE: 98.7 F | SYSTOLIC BLOOD PRESSURE: 117 MMHG | HEART RATE: 70 BPM | WEIGHT: 147.27 LBS | BODY MASS INDEX: 25.14 KG/M2 | RESPIRATION RATE: 16 BRPM

## 2025-01-16 PROBLEM — G45.9 TIA (TRANSIENT ISCHEMIC ATTACK): Status: ACTIVE | Noted: 2025-01-16

## 2025-01-16 LAB
ABO GROUP BLD: NORMAL
ALBUMIN SERPL-MCNC: 3.8 G/DL (ref 3.5–5.2)
ALBUMIN/GLOB SERPL: 1.3 G/DL
ALP SERPL-CCNC: 90 U/L (ref 39–117)
ALT SERPL W P-5'-P-CCNC: 20 U/L (ref 1–33)
ANION GAP SERPL CALCULATED.3IONS-SCNC: 9.1 MMOL/L (ref 5–15)
AORTIC DIMENSIONLESS INDEX: 0.88 (DI)
AST SERPL-CCNC: 35 U/L (ref 1–32)
AV MEAN PRESS GRAD SYS DOP V1V2: 3 MMHG
AV VMAX SYS DOP: 130 CM/SEC
BH CV ECHO LEFT VENTRICLE GLOBAL LONGITUDINAL STRAIN: -21.6 %
BH CV ECHO MEAS - AO MAX PG: 6.8 MMHG
BH CV ECHO MEAS - AO V2 VTI: 27.1 CM
BH CV ECHO MEAS - AVA(I,D): 2.6 CM2
BH CV ECHO MEAS - EDV(CUBED): 85.2 ML
BH CV ECHO MEAS - EDV(MOD-SP4): 61.3 ML
BH CV ECHO MEAS - EF(MOD-SP4): 63.9 %
BH CV ECHO MEAS - ESV(CUBED): 24.4 ML
BH CV ECHO MEAS - ESV(MOD-SP4): 22.1 ML
BH CV ECHO MEAS - FS: 34.1 %
BH CV ECHO MEAS - IVS/LVPW: 1 CM
BH CV ECHO MEAS - IVSD: 0.8 CM
BH CV ECHO MEAS - LA DIMENSION: 3.3 CM
BH CV ECHO MEAS - LAT PEAK E' VEL: 9.4 CM/SEC
BH CV ECHO MEAS - LV DIASTOLIC VOL/BSA (35-75): 35.7 CM2
BH CV ECHO MEAS - LV MASS(C)D: 109.4 GRAMS
BH CV ECHO MEAS - LV MAX PG: 5.3 MMHG
BH CV ECHO MEAS - LV MEAN PG: 3 MMHG
BH CV ECHO MEAS - LV SYSTOLIC VOL/BSA (12-30): 12.9 CM2
BH CV ECHO MEAS - LV V1 MAX: 115 CM/SEC
BH CV ECHO MEAS - LV V1 VTI: 22.1 CM
BH CV ECHO MEAS - LVIDD: 4.4 CM
BH CV ECHO MEAS - LVIDS: 2.9 CM
BH CV ECHO MEAS - LVOT AREA: 3.1 CM2
BH CV ECHO MEAS - LVOT DIAM: 2 CM
BH CV ECHO MEAS - LVPWD: 0.8 CM
BH CV ECHO MEAS - MED PEAK E' VEL: 7 CM/SEC
BH CV ECHO MEAS - MR MAX PG: 57.8 MMHG
BH CV ECHO MEAS - MR MAX VEL: 380 CM/SEC
BH CV ECHO MEAS - MV A DUR: 0.14 SEC
BH CV ECHO MEAS - MV A MAX VEL: 112 CM/SEC
BH CV ECHO MEAS - MV DEC SLOPE: 297 CM/SEC2
BH CV ECHO MEAS - MV DEC TIME: 0.22 SEC
BH CV ECHO MEAS - MV E MAX VEL: 76.5 CM/SEC
BH CV ECHO MEAS - MV E/A: 0.68
BH CV ECHO MEAS - MV MAX PG: 5.5 MMHG
BH CV ECHO MEAS - MV MEAN PG: 2 MMHG
BH CV ECHO MEAS - MV P1/2T: 96.3 MSEC
BH CV ECHO MEAS - MV V2 VTI: 42 CM
BH CV ECHO MEAS - MVA(P1/2T): 2.28 CM2
BH CV ECHO MEAS - MVA(VTI): 1.65 CM2
BH CV ECHO MEAS - SV(LVOT): 69.4 ML
BH CV ECHO MEAS - SV(MOD-SP4): 39.2 ML
BH CV ECHO MEAS - SVI(LVOT): 40.5 ML/M2
BH CV ECHO MEAS - SVI(MOD-SP4): 22.8 ML/M2
BH CV ECHO MEAS - TAPSE (>1.6): 1.93 CM
BH CV ECHO MEAS - TR MAX PG: 23.8 MMHG
BH CV ECHO MEAS - TR MAX VEL: 244 CM/SEC
BH CV ECHO MEASUREMENTS AVERAGE E/E' RATIO: 9.33
BH CV XLRA - RV BASE: 3 CM
BH CV XLRA - RV LENGTH: 5.5 CM
BH CV XLRA - RV MID: 2.3 CM
BH CV XLRA - TDI S': 13.2 CM/SEC
BILIRUB SERPL-MCNC: 0.5 MG/DL (ref 0–1.2)
BLD GP AB SCN SERPL QL: NEGATIVE
BUN SERPL-MCNC: 16 MG/DL (ref 8–23)
BUN/CREAT SERPL: 13.4 (ref 7–25)
CALCIUM SPEC-SCNC: 9.2 MG/DL (ref 8.6–10.5)
CHLORIDE SERPL-SCNC: 95 MMOL/L (ref 98–107)
CHOLEST SERPL-MCNC: 134 MG/DL (ref 0–200)
CO2 SERPL-SCNC: 26.9 MMOL/L (ref 22–29)
CREAT SERPL-MCNC: 1.19 MG/DL (ref 0.57–1)
DEPRECATED RDW RBC AUTO: 41.9 FL (ref 37–54)
EGFRCR SERPLBLD CKD-EPI 2021: 45.2 ML/MIN/1.73
ERYTHROCYTE [DISTWIDTH] IN BLOOD BY AUTOMATED COUNT: 12.8 % (ref 12.3–15.4)
ERYTHROCYTE [SEDIMENTATION RATE] IN BLOOD: 27 MM/HR (ref 0–30)
FOLATE SERPL-MCNC: 17.1 NG/ML (ref 4.78–24.2)
GLOBULIN UR ELPH-MCNC: 3 GM/DL
GLUCOSE BLDC GLUCOMTR-MCNC: 107 MG/DL (ref 70–105)
GLUCOSE BLDC GLUCOMTR-MCNC: 108 MG/DL (ref 70–105)
GLUCOSE BLDC GLUCOMTR-MCNC: 98 MG/DL (ref 70–105)
GLUCOSE SERPL-MCNC: 104 MG/DL (ref 65–99)
HBA1C MFR BLD: 5.99 % (ref 4.8–5.6)
HCT VFR BLD AUTO: 36.5 % (ref 34–46.6)
HDLC SERPL-MCNC: 64 MG/DL (ref 40–60)
HGB BLD-MCNC: 12.5 G/DL (ref 12–15.9)
LDLC SERPL CALC-MCNC: 54 MG/DL (ref 0–100)
LDLC/HDLC SERPL: 0.84 {RATIO}
LEFT ATRIUM VOLUME INDEX: 13 ML/M2
LV EF BIPLANE MOD: 63 %
MCH RBC QN AUTO: 30.6 PG (ref 26.6–33)
MCHC RBC AUTO-ENTMCNC: 34.2 G/DL (ref 31.5–35.7)
MCV RBC AUTO: 89.2 FL (ref 79–97)
PLATELET # BLD AUTO: 245 10*3/MM3 (ref 140–450)
PMV BLD AUTO: 10.3 FL (ref 6–12)
POTASSIUM SERPL-SCNC: 3.7 MMOL/L (ref 3.5–5.2)
PROT SERPL-MCNC: 6.8 G/DL (ref 6–8.5)
RBC # BLD AUTO: 4.09 10*6/MM3 (ref 3.77–5.28)
RH BLD: POSITIVE
SINUS: 3.2 CM
SODIUM SERPL-SCNC: 131 MMOL/L (ref 136–145)
STJ: 2.7 CM
T&S EXPIRATION DATE: NORMAL
TRIGL SERPL-MCNC: 81 MG/DL (ref 0–150)
TSH SERPL DL<=0.05 MIU/L-ACNC: 3.42 UIU/ML (ref 0.27–4.2)
VIT B12 BLD-MCNC: 1355 PG/ML (ref 211–946)
VLDLC SERPL-MCNC: 16 MG/DL (ref 5–40)
WBC NRBC COR # BLD AUTO: 8.24 10*3/MM3 (ref 3.4–10.8)

## 2025-01-16 PROCEDURE — 80061 LIPID PANEL: CPT | Performed by: PSYCHIATRY & NEUROLOGY

## 2025-01-16 PROCEDURE — 86901 BLOOD TYPING SEROLOGIC RH(D): CPT | Performed by: EMERGENCY MEDICINE

## 2025-01-16 PROCEDURE — 25010000002 HEPARIN (PORCINE) PER 1000 UNITS

## 2025-01-16 PROCEDURE — 85652 RBC SED RATE AUTOMATED: CPT | Performed by: PSYCHIATRY & NEUROLOGY

## 2025-01-16 PROCEDURE — 93306 TTE W/DOPPLER COMPLETE: CPT | Performed by: INTERNAL MEDICINE

## 2025-01-16 PROCEDURE — 70551 MRI BRAIN STEM W/O DYE: CPT

## 2025-01-16 PROCEDURE — 80053 COMPREHEN METABOLIC PANEL: CPT | Performed by: PSYCHIATRY & NEUROLOGY

## 2025-01-16 PROCEDURE — 82607 VITAMIN B-12: CPT | Performed by: PSYCHIATRY & NEUROLOGY

## 2025-01-16 PROCEDURE — 86850 RBC ANTIBODY SCREEN: CPT | Performed by: EMERGENCY MEDICINE

## 2025-01-16 PROCEDURE — 99222 1ST HOSP IP/OBS MODERATE 55: CPT | Performed by: PSYCHIATRY & NEUROLOGY

## 2025-01-16 PROCEDURE — 93010 ELECTROCARDIOGRAM REPORT: CPT | Performed by: STUDENT IN AN ORGANIZED HEALTH CARE EDUCATION/TRAINING PROGRAM

## 2025-01-16 PROCEDURE — 82746 ASSAY OF FOLIC ACID SERUM: CPT | Performed by: PSYCHIATRY & NEUROLOGY

## 2025-01-16 PROCEDURE — 82948 REAGENT STRIP/BLOOD GLUCOSE: CPT

## 2025-01-16 PROCEDURE — 93356 MYOCRD STRAIN IMG SPCKL TRCK: CPT | Performed by: INTERNAL MEDICINE

## 2025-01-16 PROCEDURE — 93306 TTE W/DOPPLER COMPLETE: CPT

## 2025-01-16 PROCEDURE — 84443 ASSAY THYROID STIM HORMONE: CPT | Performed by: PSYCHIATRY & NEUROLOGY

## 2025-01-16 PROCEDURE — 97161 PT EVAL LOW COMPLEX 20 MIN: CPT

## 2025-01-16 PROCEDURE — 97165 OT EVAL LOW COMPLEX 30 MIN: CPT

## 2025-01-16 PROCEDURE — 93356 MYOCRD STRAIN IMG SPCKL TRCK: CPT

## 2025-01-16 PROCEDURE — 85027 COMPLETE CBC AUTOMATED: CPT | Performed by: PSYCHIATRY & NEUROLOGY

## 2025-01-16 PROCEDURE — 83036 HEMOGLOBIN GLYCOSYLATED A1C: CPT | Performed by: PSYCHIATRY & NEUROLOGY

## 2025-01-16 PROCEDURE — 86900 BLOOD TYPING SEROLOGIC ABO: CPT | Performed by: EMERGENCY MEDICINE

## 2025-01-16 RX ORDER — IBUPROFEN 600 MG/1
600 TABLET, FILM COATED ORAL EVERY 8 HOURS PRN
COMMUNITY
End: 2025-01-16 | Stop reason: HOSPADM

## 2025-01-16 RX ORDER — ALBUTEROL SULFATE 90 UG/1
2 INHALANT RESPIRATORY (INHALATION) EVERY 4 HOURS PRN
COMMUNITY

## 2025-01-16 RX ORDER — HEPARIN SODIUM 5000 [USP'U]/ML
5000 INJECTION, SOLUTION INTRAVENOUS; SUBCUTANEOUS EVERY 12 HOURS SCHEDULED
Status: DISCONTINUED | OUTPATIENT
Start: 2025-01-16 | End: 2025-01-16 | Stop reason: HOSPADM

## 2025-01-16 RX ORDER — VALSARTAN 80 MG/1
160 TABLET ORAL
Status: DISCONTINUED | OUTPATIENT
Start: 2025-01-16 | End: 2025-01-16 | Stop reason: HOSPADM

## 2025-01-16 RX ORDER — ONDANSETRON 4 MG/1
4 TABLET, FILM COATED ORAL EVERY 8 HOURS PRN
COMMUNITY

## 2025-01-16 RX ORDER — GABAPENTIN 300 MG/1
300 CAPSULE ORAL NIGHTLY
Status: DISCONTINUED | OUTPATIENT
Start: 2025-01-16 | End: 2025-01-16 | Stop reason: HOSPADM

## 2025-01-16 RX ORDER — ROSUVASTATIN CALCIUM 10 MG/1
10 TABLET, COATED ORAL NIGHTLY
Status: DISCONTINUED | OUTPATIENT
Start: 2025-01-16 | End: 2025-01-16 | Stop reason: HOSPADM

## 2025-01-16 RX ORDER — SODIUM CHLORIDE 0.9 % (FLUSH) 0.9 %
10 SYRINGE (ML) INJECTION AS NEEDED
Status: DISCONTINUED | OUTPATIENT
Start: 2025-01-16 | End: 2025-01-16 | Stop reason: HOSPADM

## 2025-01-16 RX ORDER — HEPARIN SODIUM 5000 [USP'U]/ML
5000 INJECTION, SOLUTION INTRAVENOUS; SUBCUTANEOUS EVERY 8 HOURS SCHEDULED
Status: DISCONTINUED | OUTPATIENT
Start: 2025-01-16 | End: 2025-01-16

## 2025-01-16 RX ORDER — AMITRIPTYLINE HYDROCHLORIDE 10 MG/1
10 TABLET ORAL NIGHTLY PRN
Status: DISCONTINUED | OUTPATIENT
Start: 2025-01-16 | End: 2025-01-16 | Stop reason: HOSPADM

## 2025-01-16 RX ORDER — ASPIRIN 81 MG/1
81 TABLET, CHEWABLE ORAL DAILY
Status: DISCONTINUED | OUTPATIENT
Start: 2025-01-16 | End: 2025-01-16

## 2025-01-16 RX ORDER — HYDROCHLOROTHIAZIDE 12.5 MG/1
12.5 TABLET ORAL
Status: DISCONTINUED | OUTPATIENT
Start: 2025-01-16 | End: 2025-01-16 | Stop reason: HOSPADM

## 2025-01-16 RX ORDER — ASPIRIN 81 MG/1
81 TABLET, CHEWABLE ORAL DAILY
Qty: 90 TABLET | Refills: 0 | Status: SHIPPED | OUTPATIENT
Start: 2025-01-17

## 2025-01-16 RX ORDER — SODIUM CHLORIDE 9 MG/ML
40 INJECTION, SOLUTION INTRAVENOUS AS NEEDED
Status: DISCONTINUED | OUTPATIENT
Start: 2025-01-16 | End: 2025-01-16 | Stop reason: HOSPADM

## 2025-01-16 RX ORDER — AMOXICILLIN 250 MG/1
500 CAPSULE ORAL 2 TIMES DAILY
Status: DISCONTINUED | OUTPATIENT
Start: 2025-01-16 | End: 2025-01-16

## 2025-01-16 RX ORDER — ASPIRIN 300 MG/1
300 SUPPOSITORY RECTAL DAILY
Status: DISCONTINUED | OUTPATIENT
Start: 2025-01-16 | End: 2025-01-16

## 2025-01-16 RX ORDER — ROSUVASTATIN CALCIUM 10 MG/1
20 TABLET, COATED ORAL NIGHTLY
Qty: 90 TABLET | Refills: 0 | Status: SHIPPED | OUTPATIENT
Start: 2025-01-16

## 2025-01-16 RX ORDER — IBUPROFEN 600 MG/1
600 TABLET, FILM COATED ORAL EVERY 8 HOURS PRN
Status: DISCONTINUED | OUTPATIENT
Start: 2025-01-16 | End: 2025-01-16 | Stop reason: HOSPADM

## 2025-01-16 RX ORDER — CLOPIDOGREL BISULFATE 75 MG/1
75 TABLET ORAL DAILY
Qty: 21 TABLET | Refills: 0 | Status: SHIPPED | OUTPATIENT
Start: 2025-01-17

## 2025-01-16 RX ORDER — GABAPENTIN 300 MG/1
300 CAPSULE ORAL NIGHTLY
COMMUNITY

## 2025-01-16 RX ORDER — AMITRIPTYLINE HYDROCHLORIDE 10 MG/1
10 TABLET ORAL NIGHTLY PRN
COMMUNITY

## 2025-01-16 RX ORDER — SODIUM CHLORIDE 0.9 % (FLUSH) 0.9 %
10 SYRINGE (ML) INJECTION EVERY 12 HOURS SCHEDULED
Status: DISCONTINUED | OUTPATIENT
Start: 2025-01-16 | End: 2025-01-16 | Stop reason: HOSPADM

## 2025-01-16 RX ORDER — ATORVASTATIN CALCIUM 40 MG/1
80 TABLET, FILM COATED ORAL NIGHTLY
Status: DISCONTINUED | OUTPATIENT
Start: 2025-01-16 | End: 2025-01-16

## 2025-01-16 RX ORDER — ALBUTEROL SULFATE 0.83 MG/ML
2.5 SOLUTION RESPIRATORY (INHALATION) EVERY 4 HOURS PRN
Status: DISCONTINUED | OUTPATIENT
Start: 2025-01-16 | End: 2025-01-16 | Stop reason: HOSPADM

## 2025-01-16 RX ORDER — CLOPIDOGREL BISULFATE 75 MG/1
75 TABLET ORAL DAILY
Status: DISCONTINUED | OUTPATIENT
Start: 2025-01-16 | End: 2025-01-16 | Stop reason: HOSPADM

## 2025-01-16 RX ORDER — AMOXICILLIN 875 MG/1
875 TABLET, COATED ORAL 2 TIMES DAILY
COMMUNITY

## 2025-01-16 RX ADMIN — CLOPIDOGREL BISULFATE 75 MG: 75 TABLET ORAL at 09:00

## 2025-01-16 RX ADMIN — Medication 10 ML: at 02:53

## 2025-01-16 RX ADMIN — Medication 10 ML: at 09:00

## 2025-01-16 RX ADMIN — AMOXICILLIN AND CLAVULANATE POTASSIUM 1 TABLET: 875; 125 TABLET, FILM COATED ORAL at 09:00

## 2025-01-16 RX ADMIN — GABAPENTIN 300 MG: 300 CAPSULE ORAL at 02:53

## 2025-01-16 RX ADMIN — VALSARTAN 160 MG: 80 TABLET, FILM COATED ORAL at 14:46

## 2025-01-16 RX ADMIN — ASPIRIN 81 MG CHEWABLE TABLET 81 MG: 81 TABLET CHEWABLE at 09:00

## 2025-01-16 RX ADMIN — Medication 1 TABLET: at 14:46

## 2025-01-16 RX ADMIN — ROSUVASTATIN CALCIUM 10 MG: 10 TABLET, FILM COATED ORAL at 02:53

## 2025-01-16 RX ADMIN — HEPARIN SODIUM 5000 UNITS: 5000 INJECTION, SOLUTION INTRAVENOUS; SUBCUTANEOUS at 05:10

## 2025-01-16 NOTE — H&P
[facility] Medicine Services  History & Physical    Patient Name: Sarika Elder  : 1940  MRN: [id number]  Primary Care Physician:  Jeet Franco  Date of admission: 1/15/2025  Date and Time of Service: 1/15/2025 at 2355    Subjective      Chief Complaint: TIA    History of Present Illness: Sarika Elder is a 84 y.o. female with a CMH of hypertension, hyperlipidemia, peripheral neuropathy, anxiety who presented to [facility] on 1/15/2025 with sided weakness.  She reports watching TV a few hours prior to presentation and suddenly had numbness in her face and weakness in her left hand and some difficulty with speech.  She felt some shortness of breath.  She denies change in vision, chest pain and headaches.    To note patient was recently diagnosed with influenza and was started on amoxicillin for shortness of breath and URI symptoms that persisted.    ED course: 191/61, 20, 77, 98.1 °F, 94% on room air.  1.39, chest x-ray negative for any acute cardiopulmonary abnormalities.  Brain MRI negative for any acute intracranial abnormalities.  CTA head and neck was negative for any significant stenosis occlusions or aneurysms.  Revealed findings consistent with acute sinusitis.  Neurology was consulted in the emergency department, patient was not a TNK candidate as her symptoms resolved and she returned to baseline.  She received a loading dose of Plavix 300 mg and aspirin 81 mg.  To be admitted under telemetry for full stroke workup.      Review of Systems   Constitutional:  Negative for activity change, appetite change, chills, fatigue and fever.   HENT:  Negative for congestion, drooling, hearing loss, nosebleeds, sinus pressure, sinus pain, sneezing, sore throat, tinnitus and trouble swallowing.    Respiratory:  Negative for chest tightness, shortness of breath and wheezing.    Cardiovascular:  Negative for chest pain, palpitations and leg swelling.   Gastrointestinal:  Negative for abdominal  distention, abdominal pain, constipation, diarrhea and nausea.   Genitourinary:  Negative for difficulty urinating, dysuria and flank pain.   Musculoskeletal:  Negative for arthralgias, back pain, gait problem, joint swelling, myalgias, neck pain and neck stiffness.   Skin:  Negative for color change, pallor and rash.   Neurological:  Positive for facial asymmetry, speech difficulty and weakness (Left side). Negative for dizziness, seizures, syncope, light-headedness, numbness and headaches.   Psychiatric/Behavioral:  Negative for agitation and behavioral problems.        Personal History     Past Medical History:   Diagnosis Date    Cancer     Hyperlipidemia     Hypertension     Peripheral neuropathy        Past Surgical History:   Procedure Laterality Date    GALLBLADDER SURGERY      HYSTERECTOMY      KIDNEY SURGERY      Surgery to remove cancer        Family History: family history is not on file. Otherwise pertinent FHx was reviewed and not pertinent to current issue.    Social History:  reports that she has never smoked. She has never used smokeless tobacco. She reports that she does not drink alcohol and does not use drugs.    Home Medications:  Prior to Admission Medications       Prescriptions Last Dose Informant Patient Reported? Taking?    albuterol sulfate  (90 Base) MCG/ACT inhaler   Yes Yes    Inhale 2 puffs Every 4 (Four) Hours As Needed for Wheezing.    amitriptyline (ELAVIL) 10 MG tablet   Yes Yes    Take 1 tablet by mouth At Night As Needed for Sleep.    amoxicillin (AMOXIL) 875 MG tablet 1/15/2025  Yes Yes    Take 1 tablet by mouth 2 (Two) Times a Day.    Calcium Carbonate-Vitamin D (OSCAL-500) 500-400 MG-UNIT per tablet   Yes Yes    Take 1 tablet by mouth Daily.    gabapentin (NEURONTIN) 300 MG capsule 1/14/2025  Yes Yes    Take 1 capsule by mouth Every Night.    ibuprofen (ADVIL,MOTRIN) 600 MG tablet   Yes Yes    Take 1 tablet by mouth Every 8 (Eight) Hours As Needed for Mild Pain.     ondansetron (ZOFRAN) 4 MG tablet   Yes Yes    Take 1 tablet by mouth Every 8 (Eight) Hours As Needed for Nausea or Vomiting.    rosuvastatin (CRESTOR) 10 MG tablet   Yes Yes    Take 1 tablet by mouth Every Night.    valsartan-hydrochlorothiazide (DIOVAN-HCT) 160-12.5 MG per tablet   Yes Yes    Take 1 tablet by mouth Daily.              Allergies:  Allergies   Allergen Reactions    Latex Hives       Objective      Vitals:   Temp:  [98.1 °F (36.7 °C)] 98.1 °F (36.7 °C)  Heart Rate:  [65-86] 73  Resp:  [14-20] 14  BP: (128-191)/(61-71) 156/71  Body mass index is 25.28 kg/m².  Physical Exam  Constitutional:       Appearance: Normal appearance.   HENT:      Head: Normocephalic.   Cardiovascular:      Rate and Rhythm: Normal rate and regular rhythm.      Pulses: Normal pulses.      Heart sounds: Normal heart sounds. No murmur heard.  Pulmonary:      Effort: Pulmonary effort is normal. No respiratory distress.      Breath sounds: Normal breath sounds. No wheezing or rales.   Abdominal:      General: Bowel sounds are normal. There is no distension.      Tenderness: There is no abdominal tenderness.   Musculoskeletal:         General: No swelling, tenderness, deformity or signs of injury.      Cervical back: Normal range of motion.   Neurological:      General: No focal deficit present.      Mental Status: She is alert and oriented to person, place, and time. Mental status is at baseline.      Cranial Nerves: No cranial nerve deficit.      Sensory: No sensory deficit.      Motor: No weakness.      Coordination: Coordination normal.         Diagnostic Data:  Lab Results (last 24 hours)       Procedure Component Value Units Date/Time    Comprehensive Metabolic Panel [[id number]]  (Abnormal) Collected: 01/15/25 2334    Specimen: Blood Updated: 01/15/25 2357     Glucose 107 mg/dL      BUN 19 mg/dL      Creatinine 1.39 mg/dL      Sodium 134 mmol/L      Potassium 4.0 mmol/L      Chloride 95 mmol/L      CO2 27.9 mmol/L      Calcium  9.4 mg/dL      Total Protein 7.4 g/dL      Albumin 4.0 g/dL      ALT (SGPT) 20 U/L      AST (SGOT) 31 U/L      Alkaline Phosphatase 91 U/L      Total Bilirubin 0.4 mg/dL      Globulin 3.4 gm/dL      A/G Ratio 1.2 g/dL      BUN/Creatinine Ratio 13.7     Anion Gap 11.1 mmol/L      eGFR 37.5 mL/min/1.73     Narrative:      GFR Categories in Chronic Kidney Disease (CKD)      GFR Category          GFR (mL/min/1.73)    Interpretation  G1                     90 or greater         Normal or high (1)  G2                      60-89                Mild decrease (1)  G3a                   45-59                Mild to moderate decrease  G3b                   30-44                Moderate to severe decrease  G4                    15-29                Severe decrease  G5                    14 or less           Kidney failure          (1)In the absence of evidence of kidney disease, neither GFR category G1 or G2 fulfill the criteria for CKD.    eGFR calculation 2021 CKD-EPI creatinine equation, which does not include race as a factor    Protime-INR [[id number]]  (Normal) Collected: 01/15/25 2334    Specimen: Blood Updated: 01/15/25 2350     Protime 13.1 Seconds      INR 0.98    aPTT [[id number]]  (Normal) Collected: 01/15/25 2334    Specimen: Blood Updated: 01/15/25 2350     PTT 22.8 seconds     [place] Draw [[id number]] Collected: 01/15/25 2334    Specimen: Blood Updated: 01/15/25 2345    Narrative:      The following orders were created for panel order [place] Draw.  Procedure                               Abnormality         Status                     ---------                               -----------         ------                     Green Top (Gel)[[id number]]                                  Final result               Lavender Top[[id number]]                                     Final result               Gold Top - SST[[id number]]                                   Final result               Light Blue Top[[id number]]                                    Final result                 Please view results for these tests on the individual orders.    Green Top (Gel) [[id number]] Collected: 01/15/25 2334    Specimen: Blood Updated: 01/15/25 2345     Extra Tube Hold for add-ons.     Comment: Auto resulted.       Lavender Top [[id number]] Collected: 01/15/25 2334    Specimen: Blood Updated: 01/15/25 2345     Extra Tube hold for add-on     Comment: Auto resulted       Light Blue Top [[id number]] Collected: 01/15/25 2334    Specimen: Blood Updated: 01/15/25 2345     Extra Tube Hold for add-ons.     Comment: Auto resulted       Gold Top - SST [[id number]] Collected: 01/15/25 2334    Specimen: Blood Updated: 01/15/25 2345     Extra Tube Hold for add-ons.     Comment: Auto resulted.       CBC & Differential [[id number]]  (Abnormal) Collected: 01/15/25 2334    Specimen: Blood Updated: 01/15/25 2338    Narrative:      The following orders were created for panel order CBC & Differential.  Procedure                               Abnormality         Status                     ---------                               -----------         ------                     CBC Auto Differential[[id number]]        Abnormal            Final result                 Please view results for these tests on the individual orders.    CBC Auto Differential [[id number]]  (Abnormal) Collected: 01/15/25 2334    Specimen: Blood Updated: 01/15/25 2338     WBC 9.66 10*3/mm3      RBC 4.20 10*6/mm3      Hemoglobin 12.8 g/dL      Hematocrit 38.0 %      MCV 90.5 fL      MCH 30.5 pg      MCHC 33.7 g/dL      RDW 12.6 %      RDW-SD 41.5 fl      MPV 10.3 fL      Platelets 234 10*3/mm3      Neutrophil % 72.8 %      Lymphocyte % 17.7 %      Monocyte % 8.5 %      Eosinophil % 0.4 %      Basophil % 0.2 %      Immature Grans % 0.4 %      Neutrophils, Absolute 7.03 10*3/mm3      Lymphocytes, Absolute 1.71 10*3/mm3      Monocytes, Absolute 0.82 10*3/mm3      Eosinophils, Absolute 0.04 10*3/mm3      Basophils,  Absolute 0.02 10*3/mm3      Immature Grans, Absolute 0.04 10*3/mm3      nRBC 0.0 /100 WBC     POC Glucose Once [[id number]]  (Abnormal) Collected: 01/15/25 2311    Specimen: Blood Updated: 01/15/25 2313     Glucose 137 mg/dL      Comment: Serial Number: [id number]:  [id number]                Imaging Results (Last 24 Hours)       Procedure Component Value Units Date/Time    MRI Brain Without Contrast [[id number]] Collected: 01/16/25 0200     Updated: 01/16/25 0211    Narrative:      MRI BRAIN WO CONTRAST    Date of Exam: 1/16/2025 12:39 AM EST    Indication: Stroke, follow up  sttroke protocol.     Comparison: CT head with angiography and perfusion 1/15/2025.    Technique:  Routine multiplanar/multisequence sequence images of the brain were obtained without contrast administration.      Findings:  There is no diffusion restriction to suggest acute infarct. There is moderate patchy FLAIR hyperintense signal abnormality in the cerebral white matter. Cerebellum is unremarkable. Basal ganglia and brainstem appear within normal limits. Cerebral cortex   appears intact. No mass effect, midline shift or abnormal extra-axial collection. No acute or chronic intracranial hemorrhage. Midline structures appear intact. Calvarial and superficial soft tissue signal is within normal limits. There is severe diffuse   paranasal sinus mucosal disease with air-fluid levels consistent with acute sinusitis. The mastoid air cells are well aerated. Major arterial flow voids appear intact. Temporomandibular joints and parotid glands appear symmetric.      Impression:      Impression:  1.No acute intracranial abnormality.  2.Moderate chronic small vessel ischemic change.  3.Severe diffuse paranasal sinus mucosal disease with air-fluid levels consistent with acute sinusitis.            Electronically Signed: Colton Westfall MD    1/16/2025 2:09 AM EST    Workstation ID: [id number]    CT Angiogram Head w AI Analysis of LVO [[id number]]  Collected: 01/16/25 0016     Updated: 01/16/25 0024    Narrative:      CT ANGIOGRAM HEAD W AI ANALYSIS OF LVO  CT ANGIOGRAM NECK    Date of Exam: 1/15/2025 11:30 PM EST    Indication: Stroke, follow up. Neuro deficit, acute, stroke suspected    Comparison: Correlation with CT head 4/1/2021.    Technique: CTA of the head and neck was performed after the uneventful intravenous administration of iodinated contrast. Reconstructed coronal and sagittal images were also obtained. In addition, a 3-D volume rendered image was created for   interpretation. Automated exposure control and iterative reconstruction methods were used.      Findings:  Aorta: The aortic arch is unremarkable. There is conventional three-vessel arch anatomy. There is mild nonstenosing atherosclerotic plaque in the proximal left subclavian artery. Right brachiocephalic and bilateral subclavian arteries are widely patent   throughout.    Right carotid: The right common carotid artery is normal. There is mild nonstenosing mixed plaque at the carotid bifurcation extending into the proximal ECA. ECA and distal branches are patent. Cervical ICA is normal. Intracranial ICA segments are   normal. The ophthalmic artery origin is normal. No visible P-comm. No visible A-Comm. The A1 and M1 segments and distal BERNICE and MCA branches appear patent.    Left carotid: Normal CCA. There is moderate mixed plaque at the carotid bifurcation extending into the proximal ICA and ECA. There is no significant stenosis. ECA and distal branches are patent. Cervical ICA is widely patent. Intracranial ICA segments   are normal. Ophthalmic artery origin is normal. No definite P-comm. A1 and M1 segments and distal BERNICE and MCA branches are patent.    Posterior circulation: There is slight right vertebral artery dominance. The vertebral arteries following normal course and appear uniform caliber throughout the neck. No dissection. The V4 segments are patent. Basilar artery is normal.  Superior   cerebellar arteries and posterior cerebral arteries appear widely patent.    Nonvascular findings: No acute intracranial abnormality. Thinning of the orbits would suggest prior cataract surgery. There is severe diffuse paranasal sinus mucosal disease with fluid levels in the bifrontal, bilateral sphenoid and maxillary sinuses   consistent with acute sinusitis. Mastoids are clear. No evidence of a neck mass or adenopathy. Thyroid is homogeneous. The lung apices are clear. Salivary glands are symmetric. No acute osseous abnormality or destructive bone lesion. There is mild   cervical spondylosis.      Impression:      Impression:  1.No significant stenosis, occlusion or aneurysm in the head or neck.  2.Severe diffuse paranasal sinus mucosal disease with fluid levels consistent with acute sinusitis.        Electronically Signed: Colton Westfall MD    1/16/2025 12:22 AM EST    Workstation ID: [id number]    CT Angiogram Neck [[id number]] Collected: 01/16/25 0016     Updated: 01/16/25 0024    Narrative:      CT ANGIOGRAM HEAD W AI ANALYSIS OF LVO  CT ANGIOGRAM NECK    Date of Exam: 1/15/2025 11:30 PM EST    Indication: Stroke, follow up. Neuro deficit, acute, stroke suspected    Comparison: Correlation with CT head 4/1/2021.    Technique: CTA of the head and neck was performed after the uneventful intravenous administration of iodinated contrast. Reconstructed coronal and sagittal images were also obtained. In addition, a 3-D volume rendered image was created for   interpretation. Automated exposure control and iterative reconstruction methods were used.      Findings:  Aorta: The aortic arch is unremarkable. There is conventional three-vessel arch anatomy. There is mild nonstenosing atherosclerotic plaque in the proximal left subclavian artery. Right brachiocephalic and bilateral subclavian arteries are widely patent   throughout.    Right carotid: The right common carotid artery is normal. There is mild  nonstenosing mixed plaque at the carotid bifurcation extending into the proximal ECA. ECA and distal branches are patent. Cervical ICA is normal. Intracranial ICA segments are   normal. The ophthalmic artery origin is normal. No visible P-comm. No visible A-Comm. The A1 and M1 segments and distal BERNICE and MCA branches appear patent.    Left carotid: Normal CCA. There is moderate mixed plaque at the carotid bifurcation extending into the proximal ICA and ECA. There is no significant stenosis. ECA and distal branches are patent. Cervical ICA is widely patent. Intracranial ICA segments   are normal. Ophthalmic artery origin is normal. No definite P-comm. A1 and M1 segments and distal BERNICE and MCA branches are patent.    Posterior circulation: There is slight right vertebral artery dominance. The vertebral arteries following normal course and appear uniform caliber throughout the neck. No dissection. The V4 segments are patent. Basilar artery is normal. Superior   cerebellar arteries and posterior cerebral arteries appear widely patent.    Nonvascular findings: No acute intracranial abnormality. Thinning of the orbits would suggest prior cataract surgery. There is severe diffuse paranasal sinus mucosal disease with fluid levels in the bifrontal, bilateral sphenoid and maxillary sinuses   consistent with acute sinusitis. Mastoids are clear. No evidence of a neck mass or adenopathy. Thyroid is homogeneous. The lung apices are clear. Salivary glands are symmetric. No acute osseous abnormality or destructive bone lesion. There is mild   cervical spondylosis.      Impression:      Impression:  1.No significant stenosis, occlusion or aneurysm in the head or neck.  2.Severe diffuse paranasal sinus mucosal disease with fluid levels consistent with acute sinusitis.        Electronically Signed: Colton Westfall MD    1/16/2025 12:22 AM EST    Workstation ID: [id number]    XR Chest 1 View [[id number]] Collected: 01/16/25 0009      Updated: 01/16/25 0012    Narrative:      XR CHEST 1 VW    Date of Exam: 1/15/2025 11:48 PM EST    Indication: Acute Stroke Protocol (onset < 12 hrs)    Comparison: 1/7/2025.    Findings:  There is no pneumothorax, pleural effusion or focal airspace consolidation. Heart size and pulmonary vasculature appear within normal limits. Regional bones appear intact.      Impression:      Impression:  No acute cardiopulmonary abnormality.          Electronically Signed: Colton Westfall MD    1/16/2025 12:10 AM EST    Workstation ID: [id number]    CT CEREBRAL PERFUSION WITH & WITHOUT CONTRAST [[id number]] Collected: 01/15/25 2349     Updated: 01/15/25 2351    Narrative:      CT CEREBRAL PERFUSION W WO CONTRAST    Date of Exam: 1/15/2025 11:30 PM EST    Indication: Neuro deficit, acute, stroke suspected     Comparison: None available.    Technique: Axial CT images of the brain were obtained prior to and after the administration of iodinated contrast. CT Perfusion protocol was utilized. Automated post processing was performed by RAPID software and submitted to PACS for interpretation.   Automated exposure control and iterative reconstruction was utilized.      Findings:  Cerebral blood flow, blood volume and mean transit time maps are symmetric without large perfusion defect.    CBF<30% volume: 0 mL  Tmax>6sec volume: 0 mL  Mismatch volume: 0 mL  Mismatch ratio: None.      Impression:      Impression:  No significant perfusion abnormality in the brain.            Electronically Signed: Colton Westfall MD    1/15/2025 11:49 PM EST    Workstation ID: [id number]    CT Head Without Contrast Stroke Protocol [[id number]] Collected: 01/15/25 2326     Updated: 01/15/25 2331    Narrative:      CT HEAD WO CONTRAST STROKE PROTOCOL    Date of Exam: 1/15/2025 11:22 PM EST    Indication: Neuro deficit, acute, stroke suspected    Comparison: 4/1/2021.    Technique: Axial CT images were obtained of the head without contrast administration.   Reconstructed coronal and sagittal images were also obtained. Automated exposure control and iterative construction methods were used.    Scan Time: 2323 hours  Results discussed with Dr. Bose at 2327 hours.      Findings:  Superficial soft tissues appear within normal limits. The calvarium is intact.  Paranasal sinuses and mastoid air cells appear well aerated. There is thinning of the orbital lenses bilaterally suggesting prior lens replacement. There is no acute   intracranial hemorrhage.  No mass effect or midline shift.  No abnormal extra-axial collections.  Gray-white differentiation is within normal limits. There is mild patchy white matter hypoattenuation. The ventricles appear normal in size and   configuration for the patient's age.      Impression:      Impression:  1.No acute intracranial abnormality.  2.Mild chronic small vessel ischemic change.            Electronically Signed: Colton Westfall MD    1/15/2025 11:29 PM EST    Workstation ID: [id number]              Assessment & Plan        This is a 84 y.o. female with:    Active and Resolved Problems  Active Hospital Problems    Diagnosis  POA    **TIA (transient ischemic attack) [G45.9]  Yes      Resolved Hospital Problems   No resolved problems to display.       Left-sided weakness due to TIA  Hypertensive emergency history of hypertension  -Admit under stroke protocol  -CT head without contrast, CTA head and neck, CT cerebral perfusion with and without contrast, MRI brain without contrast also showed no acute findings  -Echocardiogram with bubble study pending  -Passed bedside swallow eval, received baby aspirin 81 mg and a loading dose of Plavix 300 mg.  -Continue on baby aspirin 81 mg and Plavix 75 mg x 21 days then baby aspirin indefinitely as contraindicated  -Received as atorvastatin 80 mg  -Lipid panel, sed rate, TSH, A1c, B12 and folic acid in the a.m.  -Allow permissive hypertension with systolic blood pressure less than 220/110 PT OT  eval  -Patient is not a TNK candidate as she returned to baseline  -Patient was seen by neurology the emergency department    Acute sinusitis due to recent influenza infection  -Continue 10-day course of Augmentin  -Supportive care    REBECCA versus REBECCA on CKD  -Likely due to hypertension  -Hold home dose of valsartan-hydrochlorothiazide 160-125 mg and reassess in the a.m.  -Repeat BMP in the a.m.    Hyperlipidemia  -Start on Lipitor 80 mg daily  -Hold home dose of rosuvastatin 10 mg    Peripheral neuropathy  -Continue on gabapentin 300 mg at night    Anxiety  -Continue amitriptyline 10 mg at night    VTE Prophylaxis:  Pharmacologic & mechanical VTE prophylaxis orders are present.        The patient desires to be as follows:    CODE STATUS:    Code Status (Patient has no pulse and is not breathing): CPR (Attempt to Resuscitate)  Medical Interventions (Patient has pulse or is breathing): Full Support        Aaron Elder, who can be contacted at [phone], is the designated person to make medical decisions on the patient's behalf if She is incapable of doing so. This was clarified with patient and/or next of kin on 1/15/2025 during the course of this H&P.    Admission Status:  I believe this patient meets inpatient status.    Expected Length of Stay: greater than 2 nights    PDMP and Medication Dispenses via Sidebar reviewed and consistent with patient reported medications.    I discussed the patient's findings and my recommendations with patient and family.      Signature:     This document has been electronically signed by Horace Hylton MD on January 16, 2025 02:32 [facility]

## 2025-01-16 NOTE — PLAN OF CARE
Goal Outcome Evaluation:  Plan of Care Reviewed With: patient, spouse           Outcome Evaluation: Sarika Elder is an 84 y.o. female with HTN, HLD, Peripheral nueropathy and anxiety who presents with episode of L facial and hand weakness and speech difficulty. CT & MRI reveal no acute abnormality.  Of note, pt recently had influenza. At baseline, pt is independent for all mobility and ADLs without AD.  She has been less active in the last week due to illness/influenza.  She is currently going to OP PT for her balance deficits related to peripheral neuropathy.  At time of PT evaluation, she is A&O x 4. She demonstrates independence for bed mobility and transfers.  Initially she requires CGA/HHA for ambulation to bathroom with unsteady gait and flexed posture.  After toileting indepedently, she walked 200' with HH- SBA with improved gait speed and posture.  She was encouraged to take frequent, short walks around her home (inside) upon discharge to build her endurance. She would benefit from continued OP PT for higher level balance work.    Anticipated Discharge Disposition (PT): home with outpatient therapy services

## 2025-01-16 NOTE — CONSULTS
Primary Care Provider: Provider, No Known     Consult requested by: Admitting team    Reason for Consultation: Neurological evaluation /code stroke    History taken from: patient chart family RN    Chief complaint: Left hand numbness and weakness       SUBJECTIVE:    History of present illness: Background per H&P: Sarika Elder is a 84 y.o. female who was evaluated in room 36 in the ER at [facility]    Source of information is the patient and the medical records  Her  was of help    This is a very pleasant 84-year-old female who actually was watching TV and suddenly noticed that she had significant weakness of the left upper extremity    She seem to have wrist drop she then noticed that some numbness type sensation in her jaw and face area  No seizures  No migraines  No twitching    She occasionally does report bilateral sudden single jerk type activity when she is sitting and relaxing    No passing out episodes    Her symptoms improved  She was seen by the stroke team and she was not clearly tenecteplase candidate and did not look like LVO    Her vital signs are stable, the first blood pressure was 191 over 61 and within 30 minutes it was as low as 128/68 so the first 1 was erroneous or outlier    Basic labs not bad  Hemoglobin A1c 5.99 LDL 54  She takes rosuvastatin 10 mg      MRI brain was unremarkable for any brain abnormality but moderate chronic small vessel ischemic changes were reported    CTA and CTP were okay    She did not take any aspirin or Plavix etc. at home    No blood thinners    She is doing great right now      Patient was already seen by Dr. Cosme,  As per admitting,  History of Present Illness: Sarika Elder is a 84 y.o. female with a CMH of hypertension, hyperlipidemia, peripheral neuropathy, anxiety who presented to [facility] on 1/15/2025 with sided weakness.  She reports watching TV a few hours prior to presentation and suddenly had numbness in her face and  weakness in her left hand and some difficulty with speech.  She felt some shortness of breath.  She denies change in vision, chest pain and headaches.     To note patient was recently diagnosed with influenza and was started on amoxicillin for shortness of breath and URI symptoms that persisted.     ED course: 191/61, 20, 77, 98.1 °F, 94% on room air.  1.39, chest x-ray negative for any acute cardiopulmonary abnormalities.  Brain MRI negative for any acute intracranial abnormalities.  CTA head and neck was negative for any significant stenosis occlusions or aneurysms.  Revealed findings consistent with acute sinusitis.  Neurology was consulted in the emergency department, patient was not a TNK candidate as her symptoms resolved and she returned to baseline.  She received a loading dose of Plavix 300 mg and aspirin 81 mg.  To be admitted under telemetry for full stroke workup.        Review of Systems   Constitutional:  Negative for activity change, appetite change, chills, fatigue and fever.   HENT:  Negative for congestion, drooling, hearing loss, nosebleeds, sinus pressure, sinus pain, sneezing, sore throat, tinnitus and trouble swallowing.    Respiratory:  Negative for chest tightness, shortness of breath and wheezing.    Cardiovascular:  Negative for chest pain, palpitations and leg swelling.   Gastrointestinal:  Negative for abdominal distention, abdominal pain, constipation, diarrhea and nausea.   Genitourinary:  Negative for difficulty urinating, dysuria and flank pain.   Musculoskeletal:  Negative for arthralgias, back pain, gait problem, joint swelling, myalgias, neck pain and neck stiffness.   Skin:  Negative for color change, pallor and rash.   Neurological:  Positive for facial asymmetry, speech difficulty and weakness (Left side). Negative for dizziness, seizures, syncope, light-headedness, numbness and headaches.   Psychiatric/Behavioral:  Negative for agitation and behavioral problems.                  - Portions of the above HPI were copied from previous encounters and edited as appropriate. PMH as detailed below.     Review of Systems   No fever chills rigors or sweats  No weight issues  No sleep problems  HEENT:  No speech problem, vision changes, facial asymmetry or pain, or neck problem  Chest: No chest pain, clubbing, cyanosis, orthopnea palpitations  Pulmonary:  No shortness of air, cough or expectoration  Abdomen:  No swelling/tension, constipation,diarrhea or pain  No genitourinary symptoms  Extremity problems as discussed  No back problem  No psychotic issues  Neurologic issues as discussed  No hematologic, dermatologic or endocrine problems        PATIENT HISTORY:  Past Medical History:   Diagnosis Date    Cancer     Hyperlipidemia     Hypertension     Peripheral neuropathy    ,   Past Surgical History:   Procedure Laterality Date    GALLBLADDER SURGERY      HYSTERECTOMY      KIDNEY SURGERY      Surgery to remove cancer    , History reviewed. No pertinent family history.,   Social History     Tobacco Use    Smoking status: Never     Passive exposure: Never    Smokeless tobacco: Never   Vaping Use    Vaping status: Never Used   Substance Use Topics    Alcohol use: No    Drug use: No   ,   Prior to Admission medications    Medication Sig Start Date End Date Taking? Authorizing Provider   albuterol sulfate  (90 Base) MCG/ACT inhaler Inhale 2 puffs Every 4 (Four) Hours As Needed for Wheezing.   Yes Brando Owens MD   amitriptyline (ELAVIL) 10 MG tablet Take 1 tablet by mouth At Night As Needed for Sleep.   Yes Brando Owens MD   amoxicillin (AMOXIL) 875 MG tablet Take 1 tablet by mouth 2 (Two) Times a Day.   Yes Brando Owens MD   Calcium Carbonate-Vitamin D (OSCAL-500) 500-400 MG-UNIT per tablet Take 1 tablet by mouth Daily.   Yes Brando Owens MD   gabapentin (NEURONTIN) 300 MG capsule Take 1 capsule by mouth Every Night.   Yes Brando Owens MD    ibuprofen (ADVIL,MOTRIN) 600 MG tablet Take 1 tablet by mouth Every 8 (Eight) Hours As Needed for Mild Pain.   Yes Brando Owens MD   ondansetron (ZOFRAN) 4 MG tablet Take 1 tablet by mouth Every 8 (Eight) Hours As Needed for Nausea or Vomiting.   Yes Brando Owens MD   rosuvastatin (CRESTOR) 10 MG tablet Take 1 tablet by mouth Every Night. 6/17/19  Yes Brando Owens MD   valsartan-hydrochlorothiazide (DIOVAN-HCT) 160-12.5 MG per tablet Take 1 tablet by mouth Daily. 2/6/21  Yes Brando Owens MD   gabapentin (NEURONTIN) 300 MG capsule Two or three cap at night 1/3/20 1/16/25  Seipel, Joseph F, MD   LORazepam (ATIVAN) 0.5 MG tablet TAKE 1/2 TO 1 TABLET BY MOUTH EVERY DAY AS NEEDED FOR NERVES *MAX 1 TABLET DAILY 9/4/19 1/16/25  Brando Owens MD    Allergies:  Latex    Current Facility-Administered Medications   Medication Dose Route Frequency Provider Last Rate Last Admin    acetaminophen (TYLENOL) tablet 650 mg  650 mg Oral Q4H PRN Chica Cosme MD        albuterol (PROVENTIL) nebulizer solution 0.083% 2.5 mg/3mL  2.5 mg Nebulization Q4H PRN Horace Hylton MD        aluminum-magnesium hydroxide-simethicone (MAALOX MAX) 400-400-40 MG/5ML suspension 7.5 mL  7.5 mL Oral Q4H PRN Chica Cosme MD        amitriptyline (ELAVIL) tablet 10 mg  10 mg Oral Nightly PRN Horace Hylton MD        amoxicillin-clavulanate (AUGMENTIN) 875-125 MG per tablet 1 tablet  1 tablet Oral Q12H Criselda Alcantara MD   1 tablet at 01/16/25 0900    aspirin chewable tablet 81 mg  81 mg Oral Daily Chica Cosme MD   81 mg at 01/16/25 0900    bisacodyl (DULCOLAX) suppository 10 mg  10 mg Rectal Daily PRN Chica Cosme MD        calcium 500 mg vitamin D 5 mcg (200 UT) per tablet 1 tablet  1 tablet Oral Daily Horace Hylton MD        clopidogrel (PLAVIX) tablet 75 mg  75 mg Oral Daily Horace Hylton MD   75 mg at 01/16/25 0900    gabapentin (NEURONTIN) capsule 300 mg  300 mg  Oral Nightly Horace Hylton MD   300 mg at 01/16/25 0253    heparin (porcine) 5000 UNIT/ML injection 5,000 Units  5,000 Units Subcutaneous Q12H Criselda Alcantara MD        valsartan (DIOVAN) tablet 160 mg  160 mg Oral Q24H Horace Hylton MD        And    [Held by provider] hydroCHLOROthiazide tablet 12.5 mg  12.5 mg Oral Q24H Horace Hylton MD        ibuprofen (ADVIL,MOTRIN) tablet 600 mg  600 mg Oral Q8H PRN Horace Hylton MD        ondansetron (ZOFRAN) injection 4 mg  4 mg Intravenous Q6H PRN Chica Cosme MD        rosuvastatin (CRESTOR) tablet 10 mg  10 mg Oral Nightly Horace Hylton MD   10 mg at 01/16/25 0253    sodium chloride 0.9 % flush 10 mL  10 mL Intravenous PRN Anup Bose DO        sodium chloride 0.9 % flush 10 mL  10 mL Intravenous Q12H Chica Cosme MD   10 mL at 01/16/25 0900    sodium chloride 0.9 % flush 10 mL  10 mL Intravenous PRN Chica Cosme MD        sodium chloride 0.9 % flush 10 mL  10 mL Intravenous Q12H Horace Hylton MD   10 mL at 01/16/25 0900    sodium chloride 0.9 % flush 10 mL  10 mL Intravenous PRN Horace Hylton MD        sodium chloride 0.9 % infusion 40 mL  40 mL Intravenous PRN Chica Cosme MD        sodium chloride 0.9 % infusion 40 mL  40 mL Intravenous PRN Horace Hylton MD         Current Outpatient Medications   Medication Sig Dispense Refill    albuterol sulfate  (90 Base) MCG/ACT inhaler Inhale 2 puffs Every 4 (Four) Hours As Needed for Wheezing.      amitriptyline (ELAVIL) 10 MG tablet Take 1 tablet by mouth At Night As Needed for Sleep.      amoxicillin (AMOXIL) 875 MG tablet Take 1 tablet by mouth 2 (Two) Times a Day.      Calcium Carbonate-Vitamin D (OSCAL-500) 500-400 MG-UNIT per tablet Take 1 tablet by mouth Daily.      gabapentin (NEURONTIN) 300 MG capsule Take 1 capsule by mouth Every Night.      ibuprofen (ADVIL,MOTRIN) 600 MG tablet Take 1 tablet by mouth Every 8 (Eight) Hours As Needed  for Mild Pain.      ondansetron (ZOFRAN) 4 MG tablet Take 1 tablet by mouth Every 8 (Eight) Hours As Needed for Nausea or Vomiting.      rosuvastatin (CRESTOR) 10 MG tablet Take 1 tablet by mouth Every Night.      valsartan-hydrochlorothiazide (DIOVAN-HCT) 160-12.5 MG per tablet Take 1 tablet by mouth Daily.          ________________________________________________________        OBJECTIVE:  Upon today's exam, the patient is resting comfortably in bed in no acute distress      The patient is awake and alert and oriented x3  Normal  Cranial nerve examination demonstrate:  Full fields of vision to confrontation  Pupils are round, reactive to light and accommodation and size of about 3 mm  No ptosis or nystagmus  Funduscopic examination was not successful  Eye movements are conjugate     Sensation on the face and scalp are normal  Muscles of mastication are normal and symmetric  Muscles of  facial expression are normal and symmetric  Hearing is intact bilaterally  Head turning and shoulder shrugs were unremarkable  Tongue was midline  I could not visualize  oropharynx or uvula     Motor examination:  Normal bulk, tone and strength was 5/5  No fasciculations     Sensory examination:  Intact for soft touch, pain   Romberg was not evaluated     Reflexes:  0/4     Coordination:  Normal finger-to-nose to finger, rapid alternating movements and toe to finger     Gait:  Deferred     Toe signs:  Mute      NIH Stroke Scale  Interval: baseline  1a. Level of Consciousness: 0-->Alert, keenly responsive  1b. LOC Questions: 0-->Answers both questions correctly  1c. LOC Commands: 0-->Performs both tasks correctly  2. Best Gaze: 0-->Normal  3. Visual: 0-->No visual loss  4. Facial Palsy: 0-->Normal symmetrical movements  5a. Motor Arm, Left: 0-->No drift, limb holds 90 (or 45) degrees for full 10 secs  5b. Motor Arm, Right: 0-->No drift, limb holds 90 (or 45) degrees for full 10 secs  6a. Motor Leg, Left: 0-->No drift, leg holds 30  degree position for full 5 secs  6b. Motor Leg, Right: 0-->No drift, leg holds 30 degree position for full 5 secs  7. Limb Ataxia: 0-->Absent  8. Sensory: 1-->Mild-to-moderate sensory loss, patient feels pinprick is less sharp or is dull on the affected side, or there is a loss of superficial pain with pinprick, but patient is aware of being touched  9. Best Language: 0-->No aphasia, normal  10. Dysarthria: 0-->Normal  11. Extinction and Inattention (formerly Neglect): 0-->No abnormality  Total (NIH Stroke Scale): 1      Noah Coma Scale  Best Eye Response: Spontaneous  Best Verbal Response: Oriented  Best Motor Response: Follows commands  [place] Coma Scale Score: 15    Modified Demario Scale  Pre-Stroke Modified [place] Scale: 0 - No Symptoms at all.         ________________________________________________________   RESULTS REVIEW:    VITAL SIGNS:   Temp:  [97.6 °F (36.4 °C)-98.4 °F (36.9 °C)] 97.6 °F (36.4 °C)  Heart Rate:  [62-86] 62  Resp:  [12-20] 18  BP: (111-191)/(57-71) 111/66     LABS:      Lab 01/16/25  0506 01/15/25  2334   WBC 8.24 9.66   HEMOGLOBIN 12.5 12.8   HEMATOCRIT 36.5 38.0   PLATELETS 245 234   NEUTROS ABS  --  7.03*   IMMATURE GRANS (ABS)  --  0.04   LYMPHS ABS  --  1.71   MONOS ABS  --  0.82   EOS ABS  --  0.04   MCV 89.2 90.5   SED RATE 27  --    PROTIME  --  13.1   APTT  --  22.8         Lab 01/16/25  0506 01/15/25  2334   SODIUM 131* 134*   POTASSIUM 3.7 4.0   CHLORIDE 95* 95*   CO2 26.9 27.9   ANION GAP 9.1 11.1   BUN 16 19   CREATININE 1.19* 1.39*   EGFR 45.2* 37.5*   GLUCOSE 104* 107*   CALCIUM 9.2 9.4   HEMOGLOBIN A1C 5.99*  --    TSH 3.420  --          Lab 01/16/25  0506 01/15/25  2334   TOTAL PROTEIN 6.8 7.4   ALBUMIN 3.8 4.0   GLOBULIN 3.0 3.4   ALT (SGPT) 20 20   AST (SGOT) 35* 31   BILIRUBIN 0.5 0.4   ALK PHOS 90 91         Lab 01/15/25  2334   PROTIME 13.1   INR 0.98         Lab 01/16/25  0506   CHOLESTEROL 134   LDL CHOL 54   HDL CHOL 64*   TRIGLYCERIDES 81         Lab  01/16/25  0002   ABO TYPING A   RH TYPING Positive   ANTIBODY SCREEN Negative         UA          6/20/2024    11:36   Urinalysis   Specific Gravity, UA 1.01       Nitrite, UA Negative          Details          This result is from an external source.               Lab Results   Component Value Date    TSH 3.420 01/16/2025    LDL 54 01/16/2025    HGBA1C 5.99 (H) 01/16/2025    [id number] 07/23/2024       IMAGING STUDIES:  MRI Brain Without Contrast    Result Date: 1/16/2025  Impression: 1.No acute intracranial abnormality. 2.Moderate chronic small vessel ischemic change. 3.Severe diffuse paranasal sinus mucosal disease with air-fluid levels consistent with acute sinusitis. Electronically Signed: Colton Westfall MD  1/16/2025 2:09 AM EST  Workstation ID: [id number]    CT Angiogram Head w AI Analysis of LVO    Result Date: 1/16/2025  Impression: 1.No significant stenosis, occlusion or aneurysm in the head or neck. 2.Severe diffuse paranasal sinus mucosal disease with fluid levels consistent with acute sinusitis. Electronically Signed: Colton Westfall MD  1/16/2025 12:22 AM EST  Workstation ID: [id number]    CT Angiogram Neck    Result Date: 1/16/2025  Impression: 1.No significant stenosis, occlusion or aneurysm in the head or neck. 2.Severe diffuse paranasal sinus mucosal disease with fluid levels consistent with acute sinusitis. Electronically Signed: Colton Westfall MD  1/16/2025 12:22 AM EST  Workstation ID: [id number]    XR Chest 1 View    Result Date: 1/16/2025  Impression: No acute cardiopulmonary abnormality. Electronically Signed: Colton Westfall MD  1/16/2025 12:10 AM EST  Workstation ID: [id number]    CT CEREBRAL PERFUSION WITH & WITHOUT CONTRAST    Result Date: 1/15/2025  Impression: No significant perfusion abnormality in the brain. Electronically Signed: Colton Westfall MD  1/15/2025 11:49 PM EST  Workstation ID: [id number]    CT Head Without Contrast Stroke Protocol    Result Date: 1/15/2025  Impression: 1.No acute  intracranial abnormality. 2.Mild chronic small vessel ischemic change. Electronically Signed: Colton Westfall MD  1/15/2025 11:29 PM EST  Workstation ID: [id number]     I reviewed the patient's new clinical results.    ________________________________________________________     PROBLEM LIST:    TIA (transient ischemic attack)            ASSESSMENT/PLAN:  Likely TIA    Lipid profile is okay but we do recommend high-dose lipid-lowering medication like higher dose of rosuvastatin 20 to 40 mg    I am okay with DAPT for 3 weeks and then she can go on aspirin only    If echo is okay she may be discharged    Obviously she was not tenecteplase or intervention candidate    Nothing else to change right now otherwise    I have discussed the case with Dr. Alcantara briefly    Call me if needed    Modification of stroke risk factors:   - Blood pressure should be less than 130/80 outpatient, HbA1c less than 6.5, LDL less than 70; b12>500 and smoking cessation if applicable. We would be grateful if the primary team / primary care physician would keep a close watch on the above targets.  - Stroke education  - Follow up with neurologist of choice      I discussed the patient's findings and my recommendations with patient, family, nursing staff, and primary care team    Michael Cortez MD  01/16/25  09:51 EST

## 2025-01-16 NOTE — CONSULTS
Diabetes Education    Patient Name:  Sarika Elder  YOB: 1940  MRN: [id number]  Admit Date:  1/15/2025      Consult received per stroke protocol being ordered. Pt does not have hx of diabetes per problem list. Pt does not take diabetes medication at home and is not receiving diabetes medication in hospital. A1c this adm 5.99%. Pt does not meet criteria for being seen at this time.       Electronically signed by:  Martha Bishop RN  01/16/25 11:26 EST

## 2025-01-16 NOTE — CONSULTS
[facility]   Teleneurology Note    Patient Name: Sarika Elder  : 1940  MRN: [id number]  Primary Care Physician: Jeet Franco  Referring Site: [facility]   Teleneurology Initial Data           Neurologist Evaluation Date: 25 Neurologist Evaluation Time:    Date Last Known Well: 25 Time Last Known Well:      History     Chief Complaint: Last seen normal was 10:15 PM  HPI: 84-year-old right-handed white female was watching television when at 1015 she suddenly noticed that she was not in the left upper extremity and the left arm was weak.  She was brought to the emergency room at 11:24 PM as a stroke alert and I was called at 11:25 PM.  By the time the CT angiogram and the CT perfusion scan as well as the regular CT scans were completed it was 11:30 PM and I was able to visualize the patient through the telemedicine device and noticed that she is awake and alert and oriented x 4 and follows one-step, two-step and three-step commands well.  No facial asymmetry was noted and no speech impediment was noted.  She passed the bedside swallow.  There is no drift noticeable in the upper or lower extremities.  The finger-to-nose coordination and the heel-to-shin testing were normal.  Sensations bilaterally on the face and on the upper and lower extremities were normal bilaterally without any extinction.  The NIH stroke scale is 0.  I reviewed the CT scan of the brain which was unremarkable and a CT angiogram of the head and neck with contrast did not show any retrievable thrombus and the CT perfusion scan was also unremarkable.  For that reason patient is not a candidate for any acute thrombolytic therapy but will be brought in for observation on cardiac telemetry monitoring with a full stroke workup in place.  As she passed the bedside swallow she will be on a cardiac diet and will be also given a baby aspirin with a loading dose of clopidogrel 300 mg followed by baby aspirin and Plavix  75 mg daily for the next 21 days and then baby aspirin alone.    Stroke Risk Factors/ Pertinent Data     Stroke risk factors: none  Anticoagulants prior to arrival: none  Antiplatelets prior to arrival: none  Statins prior to arrival: none     Scoring Scales     Modified Demario Scale  Pre-Stroke Modified [place] Scale: 0 - No Symptoms at all.  Intracerebral Hemmorhage (ICH) Score  [place] Coma Score: 13-15  Age>=80: yes  [place] Coma Scale  Best Eye Response: Spontaneous  Best Verbal Response: Oriented  Best Motor Response: Follows commands  [place] Coma Scale Score: 15    NIH Stroke Scale     NIHSS Performed Date: 01/16/25 NIHSS Performed Time: 2330   Interval: baseline  1a. Level of Consciousness: 0-->Alert, keenly responsive  1b. LOC Questions: 0-->Answers both questions correctly  1c. LOC Commands: 0-->Performs both tasks correctly  2. Best Gaze: 0-->Normal  3. Visual: 0-->No visual loss  4. Facial Palsy: 0-->Normal symmetrical movements  5a. Motor Arm, Left: 0-->No drift, limb holds 90 (or 45) degrees for full 10 secs  5b. Motor Arm, Right: 0-->No drift, limb holds 90 (or 45) degrees for full 10 secs  6a. Motor Leg, Left: 0-->No drift, leg holds 30 degree position for full 5 secs  6b. Motor Leg, Right: 0-->No drift, leg holds 30 degree position for full 5 secs  7. Limb Ataxia: 0-->Absent  8. Sensory: 0-->Normal, no sensory loss  9. Best Language: 0-->No aphasia, normal  10. Dysarthria: 0-->Normal  11. Extinction and Inattention (formerly Neglect): 0-->No abnormality  Total (NIH Stroke Scale): 0     Review of Systems     Review of Systems   HENT: Negative.     Eyes: Negative.    Respiratory: Negative.     Cardiovascular: Negative.    Gastrointestinal: Negative.    Endocrine: Negative.    Genitourinary: Negative.    Musculoskeletal: Negative.    Skin: Negative.    Allergic/Immunologic: Negative.    Neurological:  Positive for weakness and numbness.   Hematological: Negative.    Psychiatric/Behavioral: Negative.        No history of any hypertension but she was hypertensive when she got here with a blood pressure of 191/61.  Objective   Exam     Exam performed with the help of support staff from the referring site  Neurological Exam  Mental Status  Awake, alert and oriented to person, place and time. Oriented to person, place, time and situation. Recent and remote memory are intact. Speech is normal. Language is fluent with no aphasia. Attention and concentration are normal. Fund of knowledge is appropriate for level of education.    Cranial Nerves  CN I: Sense of smell is normal.  CN II: Visual acuity is normal. Visual fields full to confrontation.  CN III, IV, VI: Extraocular movements intact bilaterally. Normal lids and orbits bilaterally. Pupils equal round and reactive to light bilaterally.  CN V: Facial sensation is normal.  CN VII: Full and symmetric facial movement.  CN IX, X: Palate elevates symmetrically. Normal gag reflex.  CN XI: Shoulder shrug strength is normal.  CN XII: Tongue midline without atrophy or fasciculations.    Motor  Normal muscle bulk throughout. No fasciculations present. Normal muscle tone. No abnormal involuntary movements.    Sensory  Sensation is intact to light touch, pinprick, vibration and proprioception in all four extremities.    Coordination    Finger-to-nose, rapid alternating movements and heel-to-shin normal bilaterally without dysmetria.    Gait  Casual gait is normal including stance, stride, and arm swing. Romberg is absent.    Passed bedside swallow  Result Review    Results   CT HEAD WO CONTRAST STROKE PROTOCOL     Date of Exam: 1/15/2025 11:22 PM EST     Indication: Neuro deficit, acute, stroke suspected     Comparison: 4/1/2021.     Technique: Axial CT images were obtained of the head without contrast administration.  Reconstructed coronal and sagittal images were also obtained. Automated exposure control and iterative construction methods were used.     Scan Time: 8896  hours  Results discussed with Dr. Bose at 2327 hours.        Findings:  Superficial soft tissues appear within normal limits. The calvarium is intact.  Paranasal sinuses and mastoid air cells appear well aerated. There is thinning of the orbital lenses bilaterally suggesting prior lens replacement. There is no acute   intracranial hemorrhage.  No mass effect or midline shift.  No abnormal extra-axial collections.  Gray-white differentiation is within normal limits. There is mild patchy white matter hypoattenuation. The ventricles appear normal in size and   configuration for the patient's age.     IMPRESSION:  Impression:  1.No acute intracranial abnormality.  2.Mild chronic small vessel ischemic change.      CT CEREBRAL PERFUSION W WO CONTRAST     Date of Exam: 1/15/2025 11:30 PM EST     Indication: Neuro deficit, acute, stroke suspected     Comparison: None available.     Technique: Axial CT images of the brain were obtained prior to and after the administration of iodinated contrast. CT Perfusion protocol was utilized. Automated post processing was performed by RAPID software and submitted to PACS for interpretation.   Automated exposure control and iterative reconstruction was utilized.        Findings:  Cerebral blood flow, blood volume and mean transit time maps are symmetric without large perfusion defect.     CBF<30% volume: 0 mL  Tmax>6sec volume: 0 mL  Mismatch volume: 0 mL  Mismatch ratio: None.     IMPRESSION:  Impression:  No significant perfusion abnormality in the brain.    Personal review of CNS imaging:(Official report by radiologist pending)  Imaging  CT Imaging Review: CT Imaging reviewed, NO acute infarct/ hemorrhage seen  CTA Imaging Review: CTA Imaging reviewed, NO large vessel occlusion or severe stenosis seen  CT Perfusion Review: CT perfusion reviewed, NORMAL    Thrombolytic   Thrombolytics: not applicable     Assessment & Plan   Assessment/ Plan     Assessment:  Acute Stroke Evaluation:  Stroke not suspected/ Other (Specify)- the risks of IV thrombolytic outweigh the benefits of treatment       Plan:    Patient presented with hypertensive emergency and initially had weakness and numbness of the left upper extremity which has currently resolved but I evaluated the patient through the telemedicine device and so it is considered transient ischemic attack and patient is not considered a candidate for any acute thrombolytic therapy as she is back to baseline and not a candidate for any acute thrombectomy as there is no evidence of any retrievable thrombus.  Patient will be brought in for observation under the hospitalist service on cardiac telemetry monitoring.  As she passed the bedside swallow she will be given a baby aspirin along with a loading dose of Plavix 300 mg followed by baby aspirin and Plavix 75 mg for the next 21 days and then baby aspirin alone.  As she passed the bedside swallow she will be on a cardiac diet.  Atorvastatin 80 mg at bedtime.  Lab work for tomorrow includes lipid profile, sedimentation rate, TSH, hemoglobin A1c, vitamin B12 and folic acid levels.  Allow permissible hypertension with systolic blood pressure not to exceed 220 and diastolic blood pressure not to exceed 110.  Transthoracic echocardiogram with bubble study for tomorrow to look for PFO.  MRI of the brain with stroke protocol for tomorrow to look for any acute ischemia.  Physical therapy/Occupational Therapy/speech pathology to evaluate her tomorrow to make sure she would be safe to go home independently with her family.  Stroke education was completed.  Patient will be followed up tomorrow by the inpatient neurohospitalist.       Disposition     Disposition: The patient will remain at the referring institution for further evaluation and management    Medical Decision Making  Medical Data Reviewed: Data reviewed including: clinical labs, radiology and/or medical tests, Obtaining/ reviewing old medical records,  Obtaining case history from another source  Length of visit: 30 minutes in this critical care evaluation and management of acute stroke symptoms in the emergency room setting.    I, Chica Cosme MD, saw the patient on 01/16/25 at 2330 for an initial in-patient or emergency room telememedicine face to face consult using interactive technology for 30 minutes. The location of the patient was [place]. I was located at [place], [place] .    I have proceeded with this evaluation at the request of the referring practitioner as it is felt to be an emergency setting and no appropriate specialist is available to perform this evaluation. The originating hospital has reported that this is the correct patient and has obtained consent from the patient/surrogate to perform this telemedicine evaluation(including obtaining history, performing examination and reviewing data provided by the patient an/or originating site of care provider)    I have introduced myself to the patient, provided my credentials, disclosed my location, and determined that, based on review of the patient's chart and discussion with the patient's primary team, telemedicine via a HIPAA compliant, real-time, face-to-face two-way, interactive audio and video platform is an appropriate and effective means of providing the service.    The patient/surrogate has a right to refuse this evaluation as they have been explained risks including potential loss of confidentiality, benefits, alternatives, and the potential need for subsequent face-to-face care. In this evaluation, we will be providing recommendations only.  The ultimate decision to follow or not to follow these recommendations will be left to the bedside treating/requesting practitioner.    The patient/surrogate has been notified that other healthcare professionals including technical person may be involved in this A/V evaluation.  All laws concerning confidentiality and patient access to medical  records and copies of medical records apply to telemedicine.  The patient/surrogate has received the originating site's Health Notice of Privacy Practices.    Chica Cosme MD

## 2025-01-16 NOTE — PLAN OF CARE
ST consulted for SLC evaluation per stroke protocol. Noted pt CT and MRI with no acute findings. Per neurology note, NIH score 0. Pt passed swallow screening, currently on regular/thin diet. SLC evaluation not indicated at this time, ST signing off. Reconsult if indicated.

## 2025-01-16 NOTE — THERAPY EVALUATION
Patient Name: Sarika Elder  : 1940    MRN: [id number]                              Today's Date: 2025       Admit Date: 1/15/2025    Visit Dx:     ICD-10-CM ICD-9-CM   1. Focal neurological deficit  R29.818 781.99     Patient Active Problem List   Diagnosis    Anxiety    Cyst of right kidney    Diverticulosis of intestine without perforation or abscess without bleeding    Essential hypertension    History of cholecystectomy    Hx of adenomatous colonic polyps    Hyperlipidemia    Insomnia    Peripheral neuropathy    Restless legs syndrome    S/P NEDA (total abdominal hysterectomy)    Preop cardiovascular exam    TIA (transient ischemic attack)     Past Medical History:   Diagnosis Date    Cancer     Hyperlipidemia     Hypertension     Peripheral neuropathy      Past Surgical History:   Procedure Laterality Date    GALLBLADDER SURGERY      HYSTERECTOMY      KIDNEY SURGERY      Surgery to remove cancer       General Information       Row Name 25 165          OT Time and Intention    Document Type evaluation  -SR     Mode of Treatment occupational therapy  -SR       Row Name 25 1656          Occupational Profile    Reason for Services/Referral (Occupational Profile) 84 y.o. female with a CMH of hypertension, hyperlipidemia, peripheral neuropathy, anxiety who presented to [facility] on [facility] with sided L weakness.  She reports watching TV a few hours prior to presentation and suddenly had numbness in her face and weakness in her left hand and some difficulty with speech.  Lives at home with  and is typically independent without AD except for shower chair.  She reports recent weaknss due to the flu.  -SR       Row Name 25 1656          Living Environment    People in Home spouse  -SR       Row Name 25 1656          Cognition    Orientation Status (Cognition) oriented x 4   on SBT idicating normal cognition  -SR               User Key  (r) = Recorded By, (t)  = Taken By, (c) = Cosigned By      Initials Name Provider Type    SR Tricia Mancuso, OT Occupational Therapist                     Mobility/ADL's       Row Name 01/16/25 1657          Bed Mobility    Bed Mobility bed mobility (all) activities  -SR     All Activities, [place] (Bed Mobility) independent  -SR       Row Name 01/16/25 1657          Sit-Stand Transfer    Sit-Stand [place] (Transfers) contact guard  -SR       Row Name 01/16/25 1657          Functional Mobility    Functional Mobility- Ind. Level contact guard assist  -SR     Functional Mobility- Comment Improved with time up  -SR     Patient was able to Ambulate yes  -SR       Row Name 01/16/25 1657          Activities of Daily Living    BADL Assessment/Intervention toileting  -SR       Row Name 01/16/25 1657          Toileting Assessment/Training    [place] Level (Toileting) supervision  -SR               User Key  (r) = Recorded By, (t) = Taken By, (c) = Cosigned By      Initials Name Provider Type    SR Tricia Mancuso, HEAVEN Occupational Therapist                   Obj/Interventions       Row Name 01/16/25 1658          Range of Motion Comprehensive    General Range of Motion no range of motion deficits identified  -SR       Row Name 01/16/25 1658          Strength Comprehensive (MMT)    General Manual Muscle Testing (MMT) Assessment no strength deficits identified  -SR       Row Name 01/16/25 1658          Balance    Balance Interventions sitting;standing;sit to stand;supported;static;dynamic;minimal challenge  -SR               User Key  (r) = Recorded By, (t) = Taken By, (c) = Cosigned By      Initials Name Provider Type    SR Tricai Mancuso, HEAVEN Occupational Therapist                   Goals/Plan    No documentation.                  Clinical Impression       Row Name 01/16/25 1658          Pain Assessment    Pretreatment Pain Rating 0/10 - no pain  -SR     Posttreatment Pain Rating 0/10 - no pain  -SR       [facility]  Name 01/16/25 1658          Plan of Care Review    Outcome Evaluation 84 y.o. female with a CMH of hypertension, hyperlipidemia, peripheral neuropathy, anxiety who presented to [facility] on 1/15/2025 with sided L weakness.  She reports watching TV a few hours prior to presentation and suddenly had numbness in her face and weakness in her left hand and some difficulty with speech.  Lives at home with  and is typically independent without AD except for shower chair.  She reports recent weaknss due to the flu.  Pt appears back to recent baseline.  She fatigues after walking around unit, though stability improved.  She was encouraged to use shower chair at home during bathing.  No further OT needs.  -SR       Row Name 01/16/25 1658          Therapy Assessment/Plan (OT)    Therapy Frequency (OT) evaluation only  -SR       Row Name 01/16/25 1658          Positioning and Restraints    Pre-Treatment Position in bed  -SR     Post Treatment Position bed  -SR               User Key  (r) = Recorded By, (t) = Taken By, (c) = Cosigned By      Initials Name Provider Type    SR Tricia Mancuso, OT Occupational Therapist                   Outcome Measures       Row Name 01/16/25 1047 01/16/25 0800       How much help from another person do you currently need...    Turning from your back to your side while in flat bed without using bedrails? 4  -CL 4  -SH    Moving from lying on back to sitting on the side of a flat bed without bedrails? 4  -CL 4  -SH    Moving to and from a bed to a chair (including a wheelchair)? 4  -CL 4  -SH    Standing up from a chair using your arms (e.g., wheelchair, bedside chair)? 4  -CL 4  -SH    Climbing 3-5 steps with a railing? 3  -CL 4  -SH    To walk in hospital room? 4  -CL 4  -SH    AM-PAC 6 Clicks Score (PT) 23  -CL 24  -SH              User Key  (r) = Recorded By, (t) = Taken By, (c) = Cosigned By      Initials Name Provider Type    Nikki Garner RN Registered Nurse     Shu José, PT Physical Therapist                    Occupational Therapy Education       Title: PT OT SLP Therapies (Done)       Topic: Occupational Therapy (Done)       Point: ADL training (Done)       Description:   Instruct learner(s) on proper safety adaptation and remediation techniques during self care or transfers.   Instruct in proper use of assistive devices.                  Learning Progress Summary            Patient Acceptance, E,TB, VU by  at 1/16/2025 1659                                      User Key       Initials Effective Dates Name Provider Type Discipline     06/16/21 -  Tricia Mancuso OT Occupational Therapist OT                  OT Recommendation and Plan  Therapy Frequency (OT): evaluation only  Plan of Care Review  Outcome Evaluation: 84 y.o. female with a CMH of hypertension, hyperlipidemia, peripheral neuropathy, anxiety who [facility] sided L weakness.  She reports watching TV a few hours prior to presentation and suddenly had numbness in her face and weakness in her left hand and some difficulty with speech.  Lives at home with  and is typically independent without AD except for shower chair.  She reports recent weaknss due to the flu.  Pt appears back to recent baseline.  She fatigues after walking around unit, though stability improved.  She was encouraged to use shower chair at home during bathing.  No further OT needs.     Time Calculation:         Time Calculation- OT       Row Name 01/16/25 1659             Time Calculation- OT    OT Start Time 0926  -SR      OT Stop Time 0942  -      OT Time Calculation (min) 16 min  -SR      OT Received On 01/16/25  -                User Key  (r) = Recorded By, (t) = Taken By, (c) = Cosigned By      Initials Name Provider Type     Tricia Mancuso OT Occupational Therapist                  Therapy Charges for Today       Code Description Service Date Service  Provider Modifiers Qty    19629375247 HC OT EVAL LOW COMPLEXITY 4 1/16/2025 Tricia Mancuso, OT GO 1                 Tricia Mancuso, OT  1/16/2025

## 2025-01-16 NOTE — DISCHARGE PLACEMENT REQUEST
"Sarika Elder (84 y.o. Female)       Date of Birth   1940    Social Security Number       Address   [address] [place] [address] [place] [facility]    Home Phone   [phone]    MRN   [id number]       Episcopal   Episcopalian    Marital Status                               Admission Date   1/15/25    Admission Type   Emergency    Admitting Provider   Horace Hylton MD    Attending Provider   Criselda Alcantara MD    Department, Room/Bed   [facility] EMERGENCY DEPARTMENT, 36/36       Discharge Date       Discharge Disposition       Discharge Destination                                 Attending Provider: Criselda Alcantara MD    Allergies: Latex    Isolation: None   Infection: None   Code Status: CPR    Ht: 162.6 cm (64\")   Wt: 66.8 kg (147 lb 4.3 oz)    Admission Cmt: None   Principal Problem: TIA (transient ischemic attack) [G45.9]                   Active Insurance as of 1/15/2025       Primary Coverage       Payor Plan Insurance Group Employer/Plan Group    MEDICARE MEDICARE A & B        Payor Plan Address Payor Plan Phone Number Payor Plan Fax Number Effective Dates    [address] [phone]  2/1/2005 - None Entered    [facility] [id number]         Subscriber Name Subscriber Birth Date Member ID       SARIKA ELDER R 1940 [id number]               Secondary Coverage       Payor Plan Insurance Group Employer/Plan Group    [employer] SUP   [facility] SUP              [id number]       Coverage Address Coverage Phone Number Coverage Fax Number Effective Dates    [address] [phone]  6/29/2022 - None Entered    [address]       [facility] [address]         Subscriber Name Subscriber Birth Date Member ID       SARIKA ELDER R 1940 [id number]                     Emergency Contacts        (Rel.) Home Phone Work Phone Mobile Phone    GISELLA ELDER (Spouse) -- -- [phone]                "

## 2025-01-16 NOTE — ED PROVIDER NOTES
"Subjective   History of Present Illness  Chief complaint: Patient is an 84-year-old who 10:15 PM was watching TV.  She states that she went numb in the face and it felt funny.  She states that her arm droop down and she could not control it.  She states that she was having trouble speaking as well.  Symptoms are improving but her arm does still feel somewhat weak.  She does not take any blood thinners.  She had 2 separate \"spells\" like this over the last couple of weeks.  Her primary doctor ordered an MRI but she has not been able to go to because she had the flu and just felt too ill to complete the test.  But she has become completely normal in between episodes.  Tonight started acutely at 20-15    Context:    Duration:    Timing:    Severity:    Associated Symptoms:        PCP:  LMP:      Review of Systems   Respiratory: Negative.     Cardiovascular: Negative.    Gastrointestinal: Negative.    Genitourinary: Negative.    Musculoskeletal: Negative.    Neurological:  Positive for speech difficulty, weakness and numbness.       Past Medical History:   Diagnosis Date    Cancer     Hyperlipidemia     Hypertension     Peripheral neuropathy        Allergies   Allergen Reactions    Latex Hives       Past Surgical History:   Procedure Laterality Date    GALLBLADDER SURGERY      HYSTERECTOMY      KIDNEY SURGERY      Surgery to remove cancer        No family history on file.    Social History     Socioeconomic History    Marital status:    Tobacco Use    Smoking status: Never    Smokeless tobacco: Never   Substance and Sexual Activity    Alcohol use: No    Drug use: No    Sexual activity: Defer           Objective   Physical Exam  Vitals and nursing note reviewed.   Constitutional:       Appearance: Normal appearance.   HENT:      Head: Normocephalic and atraumatic.   Cardiovascular:      Rate and Rhythm: Normal rate and regular rhythm.      Heart sounds: Normal heart sounds.   Pulmonary:      Effort: Pulmonary " effort is normal.      Breath sounds: Normal breath sounds.   Abdominal:      Tenderness: There is no abdominal tenderness.   Neurological:      Mental Status: She is alert.      Comments: Patient fully oriented.  Speaks appropriately.  She does have left arm drift and numbness.  I see no leg numbness or weakness.  No facial droop       Procedures           ED Course  ED Course as of 01/16/25 0031   Wed Jaiden 15, 2025   2330 Patient evaluated as a team B.  Code stroke initiated.  I spoke with  who will evaluate the patient. [LH]   5458 I spoke with  who evaluated the patient.  NIH is currently 0.  She is not a TNK candidate at this point [LH]      ED Course User Index  [LH] Anup Bose, DO                                                       Medical Decision Making  Patient was seen and evaluated for the above problem    Differential diagnosis includes was not limited to TIA, intracerebral hemorrhage, CVA    Patient presented within the window for TNK.  Her symptoms however were improving.   of the stroke team did evaluate the patient after code stroke was initiated.  She was not a TNK candidate with improved symptoms and NIH improved.  CT CTA and perfusion study were negative.  She will be admitted for further workup of her focal neurologic deficits that were sudden onset.  Spoke with  who agrees to admit the patient.    Critical care time 35 minutes.    Amount and/or Complexity of Data Reviewed  Labs: ordered. Decision-making details documented in ED Course.     Details: Labs reviewed by myself  Radiology: ordered and independent interpretation performed.     Details: CTs reviewed by myself as above  ECG/medicine tests: ordered.    Risk  Prescription drug management.  Decision regarding hospitalization.    Critical Care  Total time providing critical care: 35 minutes        Final diagnoses:   None   TIA    ED Disposition  ED Disposition       None            No  follow-up provider specified.       Medication List      No changes were made to your prescriptions during this visit.            Anup Bose,   01/16/25 0107

## 2025-01-16 NOTE — PLAN OF CARE
Goal Outcome Evaluation:      Pt rested throughout shift.. VSS. Pt passed beside swallow. All questions and concerns addressed/

## 2025-01-16 NOTE — DISCHARGE SUMMARY
Discharge Note   Sarika Elder 1940 [id number] 0     Date of Admission:1/15/2025     Date of Discharge: 01/16/25     Admission Diagnosis: TIA (transient ischemic attack) [G45.9]     Discharge Diagnosis:     TIA (transient ischemic attack)       Consults: neurology    Hospital Course: Sarika Elder is a 84 y.o. female with a CMH of hypertension, hyperlipidemia, peripheral neuropathy, anxiety who presented to [facility] on 1/15/2025 with sided weakness.  She reports watching TV a few hours prior to presentation and suddenly had numbness in her face and weakness in her left hand and some difficulty with speech.   Brain MRI negative for any acute intracranial abnormalities.  CTA head and neck was negative for any significant stenosis occlusions or aneurysms.  Revealed findings consistent with acute sinusitis.  Neurology was consulted in the emergency department, patient was not a TNK candidate as her symptoms resolved and she returned to baseline.  She received a loading dose of Plavix 300 mg and aspirin 81 mg.  DAPT and statin to continue on dc.  DAPT for 21 days and after that continue aspirin 81 mg. Rousvastatin increase to 20 mg now. Continue amoxicillin at home as she is already on it. Neuro clear for DC. PT OT with safe to go home.     Vitals:    01/16/25 1200   BP: 99/48   Pulse: 63   Resp: 18   Temp: 98.7 °F (37.1 °C)   SpO2: 93%        Disposition: home      Discharged Condition: good    Activity: activity as tolerated    Diet:  Diet Order   Procedures    Diet: Gastrointestinal; Fat-Restricted; Fluid Consistency: Thin (IDDSI 0)        Labs:    Results from last 7 days   Lab Units 01/16/25  0506 01/15/25  2334   WBC 10*3/mm3 8.24 9.66   HEMOGLOBIN g/dL 12.5 12.8   HEMATOCRIT % 36.5 38.0   PLATELETS 10*3/mm3 245 234       Results from last 7 days   Lab Units 01/16/25  0506 01/15/25  2334   SODIUM mmol/L 131* 134*   POTASSIUM mmol/L 3.7 4.0   CHLORIDE mmol/L 95* 95*   CO2 mmol/L 26.9 27.9   BUN  mg/dL 16 19   CREATININE mg/dL 1.19* 1.39*       Results from last 7 days   Lab Units 01/16/25  0506   HEMOGLOBIN A1C % 5.99*            Discharge Medications        ASK your doctor about these medications        Instructions Start Date   albuterol sulfate  (90 Base) MCG/ACT inhaler  Commonly known as: PROVENTIL HFA;VENTOLIN HFA;PROAIR HFA   2 puffs, Every 4 Hours PRN      amitriptyline 10 MG tablet  Commonly known as: ELAVIL   10 mg, Nightly PRN      amoxicillin 875 MG tablet  Commonly known as: AMOXIL   875 mg, 2 Times Daily      Calcium Carbonate-Vitamin D 500-400 MG-UNIT per tablet  Commonly known as: OSCAL-500   1 tablet, Daily      gabapentin 300 MG capsule  Commonly known as: NEURONTIN  Ask about: Which instructions should I use?   300 mg, Nightly      ibuprofen 600 MG tablet  Commonly known as: ADVIL,MOTRIN   600 mg, Every 8 Hours PRN      ondansetron 4 MG tablet  Commonly known as: ZOFRAN   4 mg, Every 8 Hours PRN      rosuvastatin 10 MG tablet  Commonly known as: CRESTOR   10 mg, Nightly      valsartan-hydrochlorothiazide 160-12.5 MG per tablet  Commonly known as: DIOVAN-HCT   1 tablet, Daily                Spent at least 35 minutes in the management of patient's care including but not limited to physical exam, review of vital signs, labs, cultures and imaging studies, discussing the hospital stay along with plan of care at home, preparation and coordinating of discharge, arranging follow up care and referrals as indicated. Plan also discussed with [facility].    Criselda Alcantara MD  Hospitalist  01/16/25   13:04 EST

## 2025-01-16 NOTE — PROGRESS NOTES
Sarika Elder is an 84 y.o. female admitted with TIA.     Recent Labs     01/15/25  2334   CREATININE 1.39*   BUN 19   *   K 4.0   CL 95*   CO2 27.9     Estimated Creatinine Clearance: 28.3 mL/min (A) (by C-G formula based on SCr of 1.39 mg/dL (H)).    Assessment/Plan  Amoxicillin renally adjusted to 500mg BID per the Adult Renal Dosing of Medication policy for estCrCl 10-30mL/min.     Marichuy Gallegos, PharmD  1/16/2025

## 2025-01-16 NOTE — PLAN OF CARE
Goal Outcome Evaluation:              Outcome Evaluation: 84 y.o. female with a CMH of hypertension, hyperlipidemia, peripheral neuropathy, anxiety who presented to [facility] on 1/15/2025 with sided L weakness.  She reports watching TV a few hours prior to presentation and suddenly had numbness in her face and weakness in her left hand and some difficulty with speech.  Lives at home with  and is typically independent without AD except for shower chair.  She reports recent weaknss due to the flu.  Pt appears back to recent baseline.  She fatigues after walking around unit, though stability improved.  She was encouraged to use shower chair at home during bathing.  No further OT needs.

## 2025-01-16 NOTE — THERAPY EVALUATION
Patient Name: Sarika Elder  : 1940    MRN: [id number]                              Today's Date: 2025       Admit Date: 1/15/2025    Visit Dx:     ICD-10-CM ICD-9-CM   1. Focal neurological deficit  R29.818 781.99     Patient Active Problem List   Diagnosis    Anxiety    Cyst of right kidney    Diverticulosis of intestine without perforation or abscess without bleeding    Essential hypertension    History of cholecystectomy    Hx of adenomatous colonic polyps    Hyperlipidemia    Insomnia    Peripheral neuropathy    Restless legs syndrome    S/P NEDA (total abdominal hysterectomy)    Preop cardiovascular exam    TIA (transient ischemic attack)     Past Medical History:   Diagnosis Date    Cancer     Hyperlipidemia     Hypertension     Peripheral neuropathy      Past Surgical History:   Procedure Laterality Date    GALLBLADDER SURGERY      HYSTERECTOMY      KIDNEY SURGERY      Surgery to remove cancer       General Information       Row Name 25 1039          Physical Therapy Time and Intention    Document Type evaluation  -CL     Mode of Treatment physical therapy  -CL       Row Name 25 1039          General Information    Patient Profile Reviewed yes  -CL     Prior Level of Function independent:;ADL's;all household mobility;community mobility  -CL     Existing Precautions/Restrictions no known precautions/restrictions  -CL     Barriers to Rehab impaired sensation  -CL       Row Name 25 1039          Living Environment    People in Home spouse  -CL     Name(s) of People in Home Spouse: Aaron  -CL       Row Name 25 1039          Cognition    Orientation Status (Cognition) oriented x 4  -CL       Row Name 25 1039          Safety Issues/Impairments Affecting Functional Mobility    Impairments Affecting Function (Mobility) balance;sensation/sensory awareness  -CL     Comment, Safety Issues/Impairments (Mobility) Peripheral neuropathy  -CL               User Key  (r) = Recorded  By, (t) = Taken By, (c) = Cosigned By      Initials Name Provider Type    Shu José, PT Physical Therapist                   Mobility       Row Name 01/16/25 1040          Bed Mobility    Bed Mobility bed mobility (all) activities  -CL     All Activities, [place] (Bed Mobility) independent  -CL       Row Name 01/16/25 1040          Sit-Stand Transfer    Sit-Stand [place] (Transfers) contact guard  -CL       Row Name 01/16/25 1040          Gait/Stairs (Locomotion)    [place] Level (Gait) contact guard;standby assist  -CL     Patient was able to Ambulate yes  -CL     Distance in Feet (Gait) 140  -CL     Deviations/Abnormal Patterns (Gait) gait speed decreased  -CL               User Key  (r) = Recorded By, (t) = Taken By, (c) = Cosigned By      Initials Name Provider Type    Shu José, PT Physical Therapist                   Obj/Interventions       Row Name 01/16/25 1041          Range of Motion Comprehensive    General Range of Motion no range of motion deficits identified  -CL       Row Name 01/16/25 1041          Strength Comprehensive (MMT)    General Manual Muscle Testing (MMT) Assessment no strength deficits identified  -CL       Row Name 01/16/25 1041          Balance    Balance Assessment sitting static balance;sitting dynamic balance;standing static balance;standing dynamic balance  -CL     Static Sitting Balance independent  -CL     Dynamic Sitting Balance independent  -CL     Position, Sitting Balance unsupported;sitting edge of bed;other (see comments)  on toilet  -CL     Static Standing Balance standby assist  -CL     Dynamic Standing Balance contact guard  -CL       Row Name 01/16/25 1041          Sensory Assessment (Somatosensory)    Bilateral LE Sensory Assessment impaired  -CL               User Key  (r) = Recorded By, (t) = Taken By, (c) = Cosigned By      Initials Name Provider Type    Shu José, PT Physical Therapist                   Goals/Plan     No documentation.                  Clinical Impression       Row Name 01/16/25 1041          Pain    Pretreatment Pain Rating 0/10 - no pain  -CL     Posttreatment Pain Rating 0/10 - no pain  -CL       Row Name 01/16/25 1041          Plan of Care Review    Plan of Care Reviewed With patient;spouse  -CL     Outcome Evaluation Sarika Elder is an 84 y.o. female with HTN, HLD, Peripheral nueropathy and anxiety who presents with episode of L facial and hand weakness and speech difficulty. CT & MRI reveal no acute abnormality.  Of note, pt recently had influenza. At baseline, pt is independent for all mobility and ADLs without AD.  She has been less active in the last week due to illness/influenza.  She is currently going to OP PT for her balance deficits related to peripheral neuropathy.  At time of PT evaluation, she is A&O x 4. She demonstrates independence for bed mobility and transfers.  Initially she requires CGA/HHA for ambulation to bathroom with unsteady gait and flexed posture.  After toileting indepedently, she walked 200' with HH- SBA with improved gait speed and posture.  She was encouraged to take frequent, short walks around her home (inside) upon discharge to build her endurance. She would benefit from continued OP PT for higher level balance work.  -CL       Row Name 01/16/25 1041          Therapy Assessment/Plan (PT)    Rehab Potential (PT) good  -CL     Criteria for Skilled Interventions Met (PT) yes  -CL     Therapy Frequency (PT) evaluation only  -CL     Predicted Duration of Therapy Intervention (PT) Until discharge  -CL       Row Name 01/16/25 1041          Vital Signs    Pre Systolic BP Rehab 111  -CL     Pre Treatment Diastolic BP 66  -CL     Pretreatment Heart Rate (beats/min) 80  -CL     Pre SpO2 (%) 97  -CL     O2 Delivery Pre Treatment room air  -CL       Row Name 01/16/25 1041          Positioning and Restraints    Pre-Treatment Position in bed  -CL     Post Treatment Position bed  -CL      In Bed notified nsg;supine;call light within reach;with family/caregiver  -CL               User Key  (r) = Recorded By, (t) = Taken By, (c) = Cosigned By      Initials Name Provider Type    CL Shu Buckley, PT Physical Therapist                   Outcome Measures       Row Name 01/16/25 1047 01/16/25 0236       How much help from another person do you currently need...    Turning from your back to your side while in flat bed without using bedrails? 4  -CL 4  -AD    Moving from lying on back to sitting on the side of a flat bed without bedrails? 4  -CL 4  -AD    Moving to and from a bed to a chair (including a wheelchair)? 4  -CL 4  -AD    Standing up from a chair using your arms (e.g., wheelchair, bedside chair)? 4  -CL 4  -AD    Climbing 3-5 steps with a railing? 3  -CL 3  -AD    To walk in hospital room? 4  -CL 4  -AD    AM-PAC 6 Clicks Score (PT) 23  -CL 23  -AD      Row Name 01/16/25 0003          Modified Demario Scale    Pre-Stroke Modified Demario Scale 0 - No Symptoms at all.  -AG               User Key  (r) = Recorded By, (t) = Taken By, (c) = Cosigned By      Initials Name Provider Type    Chica Monk MD Physician    CL Shu Buckley, PT Physical Therapist    Muriel Morgan, RN Registered Nurse                                 Physical Therapy Education       Title: PT OT SLP Therapies (Done)       Topic: Physical Therapy (Done)       Point: Mobility training (Done)       Learning Progress Summary            Patient Acceptance, E,TB, VU by CL at 1/16/2025 1047   Significant Other Acceptance, E,TB, VU by CL at 1/16/2025 1047                      Point: Body mechanics (Done)       Learning Progress Summary            Patient Acceptance, E,TB, VU by CL at 1/16/2025 1047   Significant Other Acceptance, E,TB, VU by CL at 1/16/2025 1047                      Point: Precautions (Done)       Learning Progress Summary            Patient Acceptance, E,TB, VU by CL at 1/16/2025 1047    Significant Other Acceptance, E,TB, VU by  at 1/16/2025 1047                                      User Key       Initials Effective Dates Name Provider Type Discipline     03/02/22 -  Shu Buckley, PT Physical Therapist PT                  PT Recommendation and Plan     Outcome Evaluation: Sarika Elder is an 84 y.o. female with HTN, HLD, Peripheral nueropathy and anxiety who presents with episode of L facial and hand weakness and speech difficulty. CT & MRI reveal no acute abnormality.  Of note, pt recently had influenza. At baseline, pt is independent for all mobility and ADLs without AD.  She has been less active in the last week due to illness/influenza.  She is currently going to OP PT for her balance deficits related to peripheral neuropathy.  At time of PT evaluation, she is A&O x 4. She demonstrates independence for bed mobility and transfers.  Initially she requires CGA/HHA for ambulation to bathroom with unsteady gait and flexed posture.  After toileting indepedently, she walked 200' with HH- SBA with improved gait speed and posture.  She was encouraged to take frequent, short walks around her home (inside) upon discharge to build her endurance. She would benefit from continued OP PT for higher level balance work.     Time Calculation:   PT Evaluation Complexity  History, PT Evaluation Complexity: 3 or more personal factors and/or comorbidities  Examination of Body Systems (PT Eval Complexity): total of 3 or more elements  Clinical Presentation (PT Evaluation Complexity): evolving  Clinical Decision Making (PT Evaluation Complexity): low complexity  Overall Complexity (PT Evaluation Complexity): low complexity     PT Charges       Row Name 01/16/25 1047             Time Calculation    Start Time 0926  -CL      Stop Time 0941  -CL      Time Calculation (min) 15 min  -CL      PT Received On 01/16/25  -CL         Time Calculation- PT    Total Timed Code Minutes- PT 0 minute(s)  -CL                 User Key  (r) = Recorded By, (t) = Taken By, (c) = Cosigned By      Initials Name Provider Type    CL Shu Buckley, PT Physical Therapist                  Therapy Charges for Today       Code Description Service Date Service Provider Modifiers Qty    [id number]  PT EVAL LOW COMPLEXITY 3 1/16/2025 Shu Buckley, PT GP 1            PT G-Codes  AM-PAC 6 Clicks Score (PT): 23  PT Discharge Summary  Anticipated Discharge Disposition (PT): home with outpatient therapy services  Reason for Discharge: At baseline function    Shu Buckley, PT  1/16/2025

## 2025-01-16 NOTE — CASE MANAGEMENT/SOCIAL WORK
Discharge Planning Assessment   [facility]     Patient Name: Sarika Elder  MRN: [id number]  Today's Date: 1/16/2025    Admit Date: 1/15/2025    Plan: From home with spouse. OPPT per Isabella Melendez.   Discharge Needs Assessment       Row Name 01/16/25 1101       Living Environment    People in Home spouse    Name(s) of People in Home Spouse Aaron    Current Living Arrangements home    Potentially Unsafe Housing Conditions none    In the past 12 months has the electric, gas, oil, or water company threatened to shut off services in your home? No    Primary Care Provided by self    Provides Primary Care For no one    Family Caregiver if Needed spouse    Family Caregiver Names Spouse Aaron    Quality of Family Relationships helpful;involved;supportive    Able to Return to Prior Arrangements yes       Resource/Environmental Concerns    Resource/Environmental Concerns none    Transportation Concerns none       Transportation Needs    In the past 12 months, has lack of transportation kept you from medical appointments or from getting medications? no    In the past 12 months, has lack of transportation kept you from meetings, work, or from getting things needed for daily living? No       Food Insecurity    Within the past 12 months, you worried that your food would run out before you got the money to buy more. Never true    Within the past 12 months, the food you bought just didn't last and you didn't have money to get more. Never true       Transition Planning    Patient/Family Anticipates Transition to home with family    Patient/Family Anticipated Services at Transition none    Transportation Anticipated car, drives self;family or friend will provide       Discharge Needs Assessment    Readmission Within the Last 30 Days no previous admission in last 30 days    Equipment Currently Used at Home none    Concerns to be Addressed no discharge needs identified;denies needs/concerns at this time    Do you want help finding or  keeping work or a job? I do not need or want help    Do you want help with school or training? For example, starting or completing job training or getting a high school diploma, GED or equivalent No    Anticipated Changes Related to Illness none    Equipment Needed After Discharge none    Provided Post Acute Provider List? N/A    Provided Post Acute Provider Quality & Resource List? N/A    Offered/Gave Vendor List no                   Discharge Plan       Row Name 01/16/25 1102       Plan    Plan From home with spouse. OPPT per Isabella Melendez.    Patient/Family in Agreement with Plan yes    Provided Post Acute Provider List? N/A    Provided Post Acute Provider Quality & Resource List? N/A    Plan Comments CM met with patient and spouse Aaron at the bedside to review discharge planning. Patient lives at home with spouse Aaron, is IADLs and drives. Spouse, Aaron, to transport patient at d/c. Confirmed PCP, insurance, and pharmacy. Patient accepts [facility]. Patient denies any difficulty affording food, utilities, or medications. Patient is current with Isabella Melendez but has been unable to attend d/t having the flu recently. DC Barriers: Neuro following, Diabetes Educator consult pending, echo results pending                  Continued Care and Services - Admitted Since 1/15/2025       Therapy       Service Provider Request Status Services Address Phone Fax Patient Preferred    [facility] - Request Sent -- [address] [place] [address] [phone] --                   Demographic Summary       Row Name 01/16/25 1100       General Information    Admission Type inpatient    Arrived From emergency department    Required Notices Provided Important Message from Medicare    Referral Source admission list    Reason for Consult discharge planning    Preferred Language English       Contact Information    Permission Granted to Share Info With     Contact Information Obtained for case  manager                   Functional Status       Row Name 01/16/25 1100       Functional Status    Usual Activity Tolerance good    Current Activity Tolerance good       Functional Status, IADL    Medications independent    Meal Preparation independent    Housekeeping independent    Laundry independent    Shopping independent    If for any reason you need help with day-to-day activities such as bathing, preparing meals, shopping, managing finances, etc., do you get the help you need? I could use a little more help    IADL Comments Pt reports needing more assistance at home and said she plans to ask her daughter for assistance       Mental Status    General Appearance WDL WDL       Mental Status Summary    Recent Changes in Mental Status/Cognitive Functioning no changes                Patient Forms       Row Name 01/16/25 0930       Patient Forms    Important Message from Medicare (IMM) Delivered  IMM per Reg 1/16             Myra Covarrubias RN     [phone]  [email or web address]

## 2025-01-17 ENCOUNTER — READMISSION MANAGEMENT (OUTPATIENT)
Dept: CALL CENTER | Facility: HOSPITAL | Age: 85
End: 2025-01-17
Payer: MEDICARE

## 2025-01-17 LAB
QT INTERVAL: 401 MS
QTC INTERVAL: 443 MS

## 2025-01-17 NOTE — OUTREACH NOTE
Prep Survey      Flowsheet Row Responses   [facility] discharged from? [facility]   Is LACE score < 7 ? Yes   Eligibility Readm Mgmt   Discharge diagnosis TIA (transient ischemic attack)   Does the patient have one of the following disease processes/diagnoses(primary or secondary)? Stroke   Does the patient have Home health ordered? No   Is there a DME ordered? No   Comments regarding appointments OPPT per Isabella Melendez   Medication alerts for this patient see AVS   Prep survey completed? Yes            Sapphire JEAN - Registered Nurse

## 2025-01-22 ENCOUNTER — READMISSION MANAGEMENT (OUTPATIENT)
Dept: CALL CENTER | Facility: HOSPITAL | Age: 85
End: 2025-01-22
Payer: MEDICARE

## 2025-01-22 NOTE — OUTREACH NOTE
Stroke Week 1 Survey      Flowsheet Row Responses   [facility] patient discharged from? [facility]   Does the patient have one of the following disease processes/diagnoses(primary or secondary)? Stroke   Week 1 attempt successful? Yes   Call start time 1104   Call end time 1109   List who call center can speak with pt   Meds reviewed with patient/caregiver? Yes   Is the patient having any side effects they believe may be caused by any medication additions or changes? No   Does the patient have all medications ordered at discharge? Yes   Is the patient taking all medications as directed (includes completed medication regime)? Yes   Comments regarding appointments Pt to call pcp for f/u appointment.   Does the patient have a primary care provider?  Yes   Has the patient kept scheduled appointments due by today? N/A   Has home health visited the patient within 72 hours of discharge? N/A   Psychosocial issues? No   Does the patient require any assistance with activities of daily living such as eating, bathing, dressing, walking, etc.? No   Does the patient have any residual symptoms from stroke/TIA? No   Did the patient receive a copy of their discharge instructions? Yes   Nursing interventions Reviewed instructions with patient   What is the patient's perception of their health status since discharge? Improving   Is the patient/caregiver able to teach back signs and symptoms related to disease process for when to call PCP? Yes   Is the patient/caregiver able to teach back signs and symptoms related to disease process for when to call 911? Yes   Is the patient/caregiver able to teach back the hierarchy of who to call/visit for symptoms/problems? PCP, Specialist, Home health nurse, Urgent Care, ED, 911 Yes   Is the patient able to teach back FAST for Stroke? B alance: Watch for sudden loss of balance, E yes: Check for vision loss, F ace: Look for an uneven smile, A rm: Check if one arm is weak, S peech: Listen for  slurred speech, T hamlet: Call 9-1-1 right away   Week 1 call completed? Yes   Is the patient interested in additional calls from an ambulatory ? No   Wrap up additional comments Pt reports improving. Pt does feel fatigued at times. Pt has no residual effects. Pt understands s/s of stroke. Pt to call pcp for f/u appointment. Pt is independent with ADL's.   Call end time 1109            Cleopatra JARVIS - Registered Nurse

## 2025-01-30 ENCOUNTER — READMISSION MANAGEMENT (OUTPATIENT)
Dept: CALL CENTER | Facility: HOSPITAL | Age: 85
End: 2025-01-30
Payer: MEDICARE

## 2025-01-30 NOTE — OUTREACH NOTE
Stroke Week 2 Survey      Flowsheet Row Responses   [facility] patient discharged from? [facility]   Does the patient have one of the following disease processes/diagnoses(primary or secondary)? Stroke   Week 2 attempt successful? Yes   Call start time 1223   Call end time 1227   Discharge diagnosis TIA (transient ischemic attack)   Meds reviewed with patient/caregiver? Yes   Is the patient having any side effects they believe may be caused by any medication additions or changes? No   Is the patient taking all medications as directed (includes completed medication regime)? Yes   Does the patient have a primary care provider?  Yes   Does the patient have an appointment with their PCP within 7 days of discharge? Yes   Has the patient kept scheduled appointments due by today? Yes   Has home health visited the patient within 72 hours of discharge? N/A   Home health comments pt is going to outpatient PT   Psychosocial issues? No   Does the patient require any assistance with activities of daily living such as eating, bathing, dressing, walking, etc.? No   Does the patient have any residual symptoms from stroke/TIA? No   Does the patient understand the diet ordered at discharge? Yes   What is the patient's perception of their health status since discharge? Improving  [still has some weakness from having the flu and still has tingling in nelli feet to knees]   Nursing interventions Nurse provided patient education   Is the patient able to teach back FAST for Stroke? B alance: Watch for sudden loss of balance, E yes: Check for vision loss, F ace: Look for an uneven smile, A rm: Check if one arm is weak, S peech: Listen for slurred speech, T hamlet: Call 9-1-1 right away   Is the patient/caregiver able to teach back the risk factors for a stroke? History of TIAs, Carotid or other artery disease, High blood pressure-goal below 120/80   Is the patient/caregiver able to teach back signs and symptoms related to disease process for  when to call 911? Yes   Is the patient/caregiver able to teach back the hierarchy of who to call/visit for symptoms/problems? PCP, Specialist, Home health nurse, Urgent Care, ED, 911 Yes   Week 2 call completed? Yes   Revoked No further contact(revokes)-requires comment   Is the patient interested in additional calls from an ambulatory ? No   Would this patient benefit from a Referral to [facility] Social Work? No   Wrap up additional comments Pt reports doing well, no questions/concerns at this time   Call end time 1227            Belia FUENTES - Registered Nurse

## 2025-05-18 ENCOUNTER — HOSPITAL ENCOUNTER (EMERGENCY)
Facility: HOSPITAL | Age: 85
Discharge: HOME | End: 2025-05-18
Attending: EMERGENCY MEDICINE | Admitting: EMERGENCY MEDICINE
Payer: MEDICARE

## 2025-05-18 ENCOUNTER — APPOINTMENT (OUTPATIENT)
Dept: CT IMAGING | Facility: HOSPITAL | Age: 85
End: 2025-05-18
Payer: MEDICARE

## 2025-05-18 VITALS
DIASTOLIC BLOOD PRESSURE: 58 MMHG | TEMPERATURE: 97.8 F | BODY MASS INDEX: 24.5 KG/M2 | SYSTOLIC BLOOD PRESSURE: 162 MMHG | OXYGEN SATURATION: 98 % | RESPIRATION RATE: 18 BRPM | HEIGHT: 64 IN | WEIGHT: 143.52 LBS | HEART RATE: 72 BPM

## 2025-05-18 DIAGNOSIS — R10.30 LOWER ABDOMINAL PAIN: ICD-10-CM

## 2025-05-18 DIAGNOSIS — K57.92 DIVERTICULITIS: Primary | ICD-10-CM

## 2025-05-18 LAB
ALBUMIN SERPL-MCNC: 4.1 G/DL (ref 3.5–5.2)
ALBUMIN/GLOB SERPL: 1.6 G/DL
ALP SERPL-CCNC: 80 U/L (ref 39–117)
ALT SERPL W P-5'-P-CCNC: 16 U/L (ref 1–33)
ANION GAP SERPL CALCULATED.3IONS-SCNC: 10.2 MMOL/L (ref 5–15)
AST SERPL-CCNC: 26 U/L (ref 1–32)
BACTERIA UR QL AUTO: ABNORMAL /HPF
BASOPHILS # BLD AUTO: 0.02 10*3/MM3 (ref 0–0.2)
BASOPHILS NFR BLD AUTO: 0.2 % (ref 0–1.5)
BILIRUB SERPL-MCNC: 0.4 MG/DL (ref 0–1.2)
BILIRUB UR QL STRIP: NEGATIVE
BUN SERPL-MCNC: 29 MG/DL (ref 8–23)
BUN/CREAT SERPL: 22 (ref 7–25)
CALCIUM SPEC-SCNC: 9.2 MG/DL (ref 8.6–10.5)
CHLORIDE SERPL-SCNC: 102 MMOL/L (ref 98–107)
CLARITY UR: ABNORMAL
CO2 SERPL-SCNC: 25.8 MMOL/L (ref 22–29)
COLOR UR: YELLOW
CREAT SERPL-MCNC: 1.32 MG/DL (ref 0.57–1)
DEPRECATED RDW RBC AUTO: 45.6 FL (ref 37–54)
EGFRCR SERPLBLD CKD-EPI 2021: 39.6 ML/MIN/1.73
EOSINOPHIL # BLD AUTO: 0.06 10*3/MM3 (ref 0–0.4)
EOSINOPHIL NFR BLD AUTO: 0.7 % (ref 0.3–6.2)
ERYTHROCYTE [DISTWIDTH] IN BLOOD BY AUTOMATED COUNT: 13 % (ref 12.3–15.4)
GLOBULIN UR ELPH-MCNC: 2.6 GM/DL
GLUCOSE SERPL-MCNC: 87 MG/DL (ref 65–99)
GLUCOSE UR STRIP-MCNC: NEGATIVE MG/DL
HCT VFR BLD AUTO: 38 % (ref 34–46.6)
HGB BLD-MCNC: 12.3 G/DL (ref 12–15.9)
HGB UR QL STRIP.AUTO: ABNORMAL
HYALINE CASTS UR QL AUTO: ABNORMAL /LPF
IMM GRANULOCYTES # BLD AUTO: 0.02 10*3/MM3 (ref 0–0.05)
IMM GRANULOCYTES NFR BLD AUTO: 0.2 % (ref 0–0.5)
KETONES UR QL STRIP: NEGATIVE
LEUKOCYTE ESTERASE UR QL STRIP.AUTO: NEGATIVE
LIPASE SERPL-CCNC: 103 U/L (ref 13–60)
LYMPHOCYTES # BLD AUTO: 0.99 10*3/MM3 (ref 0.7–3.1)
LYMPHOCYTES NFR BLD AUTO: 11 % (ref 19.6–45.3)
MCH RBC QN AUTO: 30.6 PG (ref 26.6–33)
MCHC RBC AUTO-ENTMCNC: 32.4 G/DL (ref 31.5–35.7)
MCV RBC AUTO: 94.5 FL (ref 79–97)
MONOCYTES # BLD AUTO: 0.85 10*3/MM3 (ref 0.1–0.9)
MONOCYTES NFR BLD AUTO: 9.5 % (ref 5–12)
NEUTROPHILS NFR BLD AUTO: 7.04 10*3/MM3 (ref 1.7–7)
NEUTROPHILS NFR BLD AUTO: 78.4 % (ref 42.7–76)
NITRITE UR QL STRIP: NEGATIVE
NRBC BLD AUTO-RTO: 0 /100 WBC (ref 0–0.2)
PH UR STRIP.AUTO: 5.5 [PH] (ref 5–8)
PLATELET # BLD AUTO: 171 10*3/MM3 (ref 140–450)
PMV BLD AUTO: 10.6 FL (ref 6–12)
POTASSIUM SERPL-SCNC: 3.6 MMOL/L (ref 3.5–5.2)
PROT SERPL-MCNC: 6.7 G/DL (ref 6–8.5)
PROT UR QL STRIP: NEGATIVE
RBC # BLD AUTO: 4.02 10*6/MM3 (ref 3.77–5.28)
RBC # UR STRIP: ABNORMAL /HPF
REF LAB TEST METHOD: ABNORMAL
SODIUM SERPL-SCNC: 138 MMOL/L (ref 136–145)
SP GR UR STRIP: 1.01 (ref 1–1.03)
SQUAMOUS #/AREA URNS HPF: ABNORMAL /HPF
UROBILINOGEN UR QL STRIP: ABNORMAL
WBC # UR STRIP: ABNORMAL /HPF
WBC NRBC COR # BLD AUTO: 8.98 10*3/MM3 (ref 3.4–10.8)

## 2025-05-18 PROCEDURE — 81001 URINALYSIS AUTO W/SCOPE: CPT | Performed by: EMERGENCY MEDICINE

## 2025-05-18 PROCEDURE — 99285 EMERGENCY DEPT VISIT HI MDM: CPT

## 2025-05-18 PROCEDURE — 74177 CT ABD & PELVIS W/CONTRAST: CPT

## 2025-05-18 PROCEDURE — 25510000001 IOPAMIDOL PER 1 ML: Performed by: EMERGENCY MEDICINE

## 2025-05-18 PROCEDURE — 93005 ELECTROCARDIOGRAM TRACING: CPT | Performed by: EMERGENCY MEDICINE

## 2025-05-18 PROCEDURE — 83690 ASSAY OF LIPASE: CPT | Performed by: EMERGENCY MEDICINE

## 2025-05-18 PROCEDURE — 80053 COMPREHEN METABOLIC PANEL: CPT | Performed by: EMERGENCY MEDICINE

## 2025-05-18 PROCEDURE — 85025 COMPLETE CBC W/AUTO DIFF WBC: CPT | Performed by: EMERGENCY MEDICINE

## 2025-05-18 RX ORDER — POLYETHYLENE GLYCOL 3350 17 G/17G
17 POWDER, FOR SOLUTION ORAL DAILY
Qty: 510 G | Refills: 0 | Status: SHIPPED | OUTPATIENT
Start: 2025-05-18

## 2025-05-18 RX ORDER — SODIUM CHLORIDE 0.9 % (FLUSH) 0.9 %
10 SYRINGE (ML) INJECTION AS NEEDED
Status: DISCONTINUED | OUTPATIENT
Start: 2025-05-18 | End: 2025-05-18 | Stop reason: HOSPADM

## 2025-05-18 RX ORDER — IOPAMIDOL 755 MG/ML
100 INJECTION, SOLUTION INTRAVASCULAR
Status: COMPLETED | OUTPATIENT
Start: 2025-05-18 | End: 2025-05-18

## 2025-05-18 RX ADMIN — AMOXICILLIN AND CLAVULANATE POTASSIUM 1 TABLET: 875; 125 TABLET, COATED ORAL at 18:07

## 2025-05-18 RX ADMIN — IOPAMIDOL 100 ML: 755 INJECTION, SOLUTION INTRAVENOUS at 17:29

## 2025-05-18 NOTE — ED PROVIDER NOTES
Subjective   History of Present Illness  85-year-old female presents with epigastric pain.  She has radiation to lower abdomen.  She reports she had had a tinge of pain in the last couple of days but became more constant severe last night.  She has had no fever.  No cough or shortness of breath.  She has had little bit of nausea no vomiting no diarrhea she has some chronic constipation.  She reports she has had some weak spells in the last couple weeks.  No syncope.  She did not note any particular pattern to this.  Review of Systems    Past Medical History:   Diagnosis Date    Cancer     Hyperlipidemia     Hypertension     Peripheral neuropathy        Allergies   Allergen Reactions    Latex Hives       Past Surgical History:   Procedure Laterality Date    GALLBLADDER SURGERY      HYSTERECTOMY      KIDNEY SURGERY      Surgery to remove cancer        No family history on file.    Social History     Socioeconomic History    Marital status:    Tobacco Use    Smoking status: Never     Passive exposure: Never    Smokeless tobacco: Never   Vaping Use    Vaping status: Never Used   Substance and Sexual Activity    Alcohol use: No    Drug use: No    Sexual activity: Defer     Prior to Admission medications    Medication Sig Start Date End Date Taking? Authorizing Provider   albuterol sulfate  (90 Base) MCG/ACT inhaler Inhale 2 puffs Every 4 (Four) Hours As Needed for Wheezing.    Brando Owens MD   amitriptyline (ELAVIL) 10 MG tablet Take 1 tablet by mouth At Night As Needed for Sleep.    Brando Owens MD   amoxicillin (AMOXIL) 875 MG tablet Take 1 tablet by mouth 2 (Two) Times a Day.    Brando Owens MD   aspirin 81 MG chewable tablet Chew 1 tablet Daily. 1/17/25   Criselda Alcantara MD   Calcium Carbonate-Vitamin D (OSCAL-500) 500-400 MG-UNIT per tablet Take 1 tablet by mouth Daily.    Brando Owens MD   clopidogrel (PLAVIX) 75 MG tablet Take 1 tablet by mouth Daily. 1/17/25   Quinton  MD Criselda   gabapentin (NEURONTIN) 300 MG capsule Take 1 capsule by mouth Every Night.    ProviderBrando MD   ondansetron (ZOFRAN) 4 MG tablet Take 1 tablet by mouth Every 8 (Eight) Hours As Needed for Nausea or Vomiting.    Brando Owens MD   rosuvastatin (CRESTOR) 10 MG tablet Take 2 tablets by mouth Every Night. 1/16/25   Criselda Alcantara MD   valsartan-hydrochlorothiazide (DIOVAN-HCT) 160-12.5 MG per tablet Take 1 tablet by mouth Daily. 2/6/21   ProviderBrando MD           Objective   Physical Exam  85-year-old female awake alert.  Generally well-developed well-nourished for age.  Neck supple chest clear equal breath sounds cardiovascular regular rate and rhythm without murmur abdomen soft complains of some mild epigastric and lower abdominal tenderness.  No mass rebound or guarding.  Skin without rash noted.  Extremities without tenderness edema.  Procedures           ED Course      Results for orders placed or performed during the hospital encounter of 05/18/25   Urinalysis With Microscopic If Indicated (No Culture) - Urine, Clean Catch    Collection Time: 05/18/25  4:39 PM    Specimen: Urine, Clean Catch   Result Value Ref Range    Color, UA Yellow Yellow, Straw    Appearance, UA Cloudy (A) Clear    pH, UA 5.5 5.0 - 8.0    Specific Gravity, UA 1.013 1.005 - 1.030    Glucose, UA Negative Negative    Ketones, UA Negative Negative    Bilirubin, UA Negative Negative    Blood, UA Small (1+) (A) Negative    Protein, UA Negative Negative    Leuk Esterase, UA Negative Negative    Nitrite, UA Negative Negative    Urobilinogen, UA 0.2 E.U./dL 0.2 - 1.0 E.U./dL   Urinalysis, Microscopic Only - Urine, Clean Catch    Collection Time: 05/18/25  4:39 PM    Specimen: Urine, Clean Catch   Result Value Ref Range    RBC, UA 3-5 (A) None Seen, 0-2 /HPF    WBC, UA 0-2 None Seen, 0-2 /HPF    Bacteria, UA None Seen None Seen /HPF    Squamous Epithelial Cells, UA None Seen None Seen, 0-2 /HPF    Hyaline Casts, UA  None Seen None Seen /LPF    Methodology Automated Microscopy    Comprehensive Metabolic Panel    Collection Time: 05/18/25  4:57 PM    Specimen: Blood   Result Value Ref Range    Glucose 87 65 - 99 mg/dL    BUN 29 (H) 8 - 23 mg/dL    Creatinine 1.32 (H) 0.57 - 1.00 mg/dL    Sodium 138 136 - 145 mmol/L    Potassium 3.6 3.5 - 5.2 mmol/L    Chloride 102 98 - 107 mmol/L    CO2 25.8 22.0 - 29.0 mmol/L    Calcium 9.2 8.6 - 10.5 mg/dL    Total Protein 6.7 6.0 - 8.5 g/dL    Albumin 4.1 3.5 - 5.2 g/dL    ALT (SGPT) 16 1 - 33 U/L    AST (SGOT) 26 1 - 32 U/L    Alkaline Phosphatase 80 39 - 117 U/L    Total Bilirubin 0.4 0.0 - 1.2 mg/dL    Globulin 2.6 gm/dL    A/G Ratio 1.6 g/dL    BUN/Creatinine Ratio 22.0 7.0 - 25.0    Anion Gap 10.2 5.0 - 15.0 mmol/L    eGFR 39.6 (L) >60.0 mL/min/1.73   Lipase    Collection Time: 05/18/25  4:57 PM    Specimen: Blood   Result Value Ref Range    Lipase 103 (H) 13 - 60 U/L   CBC Auto Differential    Collection Time: 05/18/25  4:57 PM    Specimen: Blood   Result Value Ref Range    WBC 8.98 3.40 - 10.80 10*3/mm3    RBC 4.02 3.77 - 5.28 10*6/mm3    Hemoglobin 12.3 12.0 - 15.9 g/dL    Hematocrit 38.0 34.0 - 46.6 %    MCV 94.5 79.0 - 97.0 fL    MCH 30.6 26.6 - 33.0 pg    MCHC 32.4 31.5 - 35.7 g/dL    RDW 13.0 12.3 - 15.4 %    RDW-SD 45.6 37.0 - 54.0 fl    MPV 10.6 6.0 - 12.0 fL    Platelets 171 140 - 450 10*3/mm3    Neutrophil % 78.4 (H) 42.7 - 76.0 %    Lymphocyte % 11.0 (L) 19.6 - 45.3 %    Monocyte % 9.5 5.0 - 12.0 %    Eosinophil % 0.7 0.3 - 6.2 %    Basophil % 0.2 0.0 - 1.5 %    Immature Grans % 0.2 0.0 - 0.5 %    Neutrophils, Absolute 7.04 (H) 1.70 - 7.00 10*3/mm3    Lymphocytes, Absolute 0.99 0.70 - 3.10 10*3/mm3    Monocytes, Absolute 0.85 0.10 - 0.90 10*3/mm3    Eosinophils, Absolute 0.06 0.00 - 0.40 10*3/mm3    Basophils, Absolute 0.02 0.00 - 0.20 10*3/mm3    Immature Grans, Absolute 0.02 0.00 - 0.05 10*3/mm3    nRBC 0.0 0.0 - 0.2 /100 WBC   ECG 12 Lead Other; weak spells    Collection  "Time: 05/18/25  5:04 PM   Result Value Ref Range    QT Interval 388 ms    QTC Interval 401 ms     CT Abdomen Pelvis With Contrast   Final Result   Impression:   Acute uncomplicated sigmoid diverticulitis. Consider follow-up colonoscopy due to short segment involvement to exclude the possibility of underlying malignancy..            Electronically Signed: Derrick Braxton MD     5/18/2025 5:40 PM EDT     Workstation ID: [id number]        Medications   sodium chloride 0.9 % flush 10 mL (has no administration in time range)   amoxicillin-clavulanate (AUGMENTIN) 875-125 MG per tablet 1 tablet (has no administration in time range)   iopamidol (ISOVUE-370) 76 % injection 100 mL (100 mL Intravenous Given 5/18/25 1729)     /58   Pulse 72   Temp 97.8 °F (36.6 °C) (Oral)   Resp 18   Ht 162.6 cm (64\")   Wt 65.1 kg (143 lb 8.3 oz)   SpO2 98%   BMI 24.64 kg/m²                                                    Medical Decision Making  Amount and/or Complexity of Data Reviewed  Labs: ordered.  Radiology: ordered.  ECG/medicine tests: ordered.    Risk  Prescription drug management.    Chart review: Patient was noted to have had admission January of this year.  She had been diagnosed with TIA.  She had negative CTA and MRI of brain.  She was treated with dual antiplatelet therapy for 21 days and rosuvastatin was increased.  Comorbidity: As per past history   Differential: UTI, diverticulitis, pancreatitis, constipation  My EKG interpretation: Sinus rhythm rate of 64.  Compared to previous no significant change  Lab: Urinalysis 3-5 red cells 0-2 white cells.  CBC essentially normal white count 8.98 with 78 segs no bands. Comprehensive metabolic panel BUN 29 creatinine 1.32 lipase mild elevation 103  My Radiology review and interpretation: CT scan abdomen pelvis show evidence of sigmoid diverticulitis with fat stranding inflammatory change.  There is no evidence of obstruction fluid collection or " perforation.  Discussion/treatment: Patient's findings were discussed with her.  She was given dose of Augmentin here.  She was discharged placed on Augmentin.  She was given MiraLAX to prevent constipation.  Advised to stay on soft diet.  To follow-up with her prime provider for recheck 2 to 3 days.  Advised that if she would develop increased pain or fever she would need to return as likely would require hospitalization.  Patient was evaluated using appropriate PPE      Final diagnoses:   Diverticulitis   Lower abdominal pain       ED Disposition  ED Disposition       ED Disposition   Discharge    Condition   Stable    Comment   --               Salvador, Jeet  [address]  [place] [address] [phone]    Schedule an appointment as soon as possible for a visit   2-3 days for recheck         Medication List        New Prescriptions      amoxicillin-clavulanate 875-125 MG per tablet  Commonly known as: AUGMENTIN  Take 1 tablet by mouth Every 12 (Twelve) Hours.     polyethylene glycol 17 GM/SCOOP powder  Commonly known as: MIRALAX  Take 17 g by mouth Daily.               Where to Get Your Medications        These medications were sent to [facility]/pharmacy #[id number] - [facility] [place] - [address] [facility] - [phone] [place] [phone]   [address] [place] [address]      Phone: [phone]   amoxicillin-clavulanate 875-125 MG per tablet  polyethylene glycol 17 GM/SCOOP powder            Memo Acosta MD  05/18/25 1750       Memo Acosta MD  05/18/25 3655

## 2025-05-18 NOTE — DISCHARGE INSTRUCTIONS
Stay on soft diet next few days.  Follow-up with primary provider 2 to 3 days for recheck, if you develop increased pain or fever would need to return as may require admission for IV antibiotics.  May use Tylenol for pain

## 2025-05-19 LAB
QT INTERVAL: 388 MS
QTC INTERVAL: 401 MS

## 2025-07-10 LAB — NCCN CRITERIA FLAG: NORMAL

## 2025-07-17 ENCOUNTER — HOSPITAL ENCOUNTER (OUTPATIENT)
Dept: MAMMOGRAPHY | Facility: HOSPITAL | Age: 85
Discharge: HOME | End: 2025-07-17
Admitting: FAMILY MEDICINE
Payer: MEDICARE

## 2025-07-17 DIAGNOSIS — Z12.31 ENCOUNTER FOR SCREENING MAMMOGRAM FOR MALIGNANT NEOPLASM OF BREAST: ICD-10-CM

## 2025-07-17 PROCEDURE — 77067 SCR MAMMO BI INCL CAD: CPT

## 2025-07-17 PROCEDURE — 77063 BREAST TOMOSYNTHESIS BI: CPT
